# Patient Record
Sex: FEMALE | Race: ASIAN | NOT HISPANIC OR LATINO | URBAN - METROPOLITAN AREA
[De-identification: names, ages, dates, MRNs, and addresses within clinical notes are randomized per-mention and may not be internally consistent; named-entity substitution may affect disease eponyms.]

---

## 2017-01-29 ENCOUNTER — OFFICE VISIT (OUTPATIENT)
Dept: URGENT CARE | Facility: URGENT CARE | Age: 42
End: 2017-01-29
Payer: COMMERCIAL

## 2017-01-29 VITALS
RESPIRATION RATE: 16 BRPM | TEMPERATURE: 98.1 F | HEART RATE: 91 BPM | DIASTOLIC BLOOD PRESSURE: 60 MMHG | SYSTOLIC BLOOD PRESSURE: 98 MMHG | BODY MASS INDEX: 19.49 KG/M2 | OXYGEN SATURATION: 98 % | WEIGHT: 110 LBS | HEIGHT: 63 IN

## 2017-01-29 DIAGNOSIS — I88.9 CERVICAL ADENITIS: Primary | ICD-10-CM

## 2017-01-29 PROCEDURE — 99213 OFFICE O/P EST LOW 20 MIN: CPT | Performed by: FAMILY MEDICINE

## 2017-01-29 NOTE — MR AVS SNAPSHOT
After Visit Summary   1/29/2017    Dennise Burroughs    MRN: [id number]           Patient Information     Date Of Birth          1975        Visit Information        Provider Department      1/29/2017 11:00 AM Melanie Sears MD [facility]        Today's Diagnoses     Swollen lymph nodes    -  1       Care Instructions      Local Lymph Node Swelling in the Neck, No Antibiotic Treatment    You have a swollen lymph node in your neck that is not infected. The lymph nodes are part of the immune system. They are found under the jaw and along the side of the neck, in the armpits, and in the groin. A nearby infection or inflammation causes the lymph nodes in that area to swell. They may also be mildly tender. This is normal.  Antibiotics are not used for a swollen lymph node that is not infected. You can use hot compresses and pain medicine to treat this condition. The pain will get better over the next 7 to 10 days. The swelling may take several months to go away.  Rarely, a bacterial infection occurs inside the lymph node, itself. When this happens, the lymph node becomes very painful and the nearby skin gets red and warm. You may also have a fever. If this happens, call your health care provider. You may need to take antibiotics. You may also need to have the lymph node drained.  Home care  Follow these guidelines when caring for yourself at home:    Make a hot compress by running hot water over a face cloth. Put the compress on the sore area until the compress cools off. Repeat this for 20 minutes. Use the hot compress 3 times a day for the first 3 days, or until the pain and redness begin to get better. The heat will make more blood flow to the area and speed the healing process.    You may use acetaminophen or ibuprofen to control pain and fever, unless another medicine was prescribed for this. Don t use ibuprofen in children under 6 months of age. If you have chronic liver or  kidney disease, talk with your health care provider before using these medicines. Also talk with your provider if you ve had a stomach ulcer or GI bleeding. Don t give aspirin to anyone under 18 years of age who is ill with a fever. It may cause severe liver damage.  Follow-up care  Follow up with your health care provider, or as advised.  When to seek medical advice  Call your health care provider right away if any of these occur:    Redness over the lymph node    Swelling or pain in the lymph node gets worse    Lymph node is getting soft in the middle    Pus or fluid drains from the lymph node    You have difficulty breathing or swallowing    Fever of 101 F (38.3 C) oral or higher that doesn t get better with fever medicine    You have questions or concerns     1778-5062 [employer]. [address] [place] [address], [place] [address]. All rights reserved. This information is not intended as a substitute for professional medical care. Always follow your healthcare professional's instructions.              Follow-ups after your visit        Who to contact     If you have questions or need follow up information about today's clinic visit or your schedule please contact [facility] directly at [phone].  Normal or non-critical lab and imaging results will be communicated to you by [employer], letter or phone within 4 business days after the clinic has received the results. If you do not hear from us within 7 days, please contact the clinic through [employer] or phone. If you have a critical or abnormal lab result, we will notify you by phone as soon as possible.  Submit refill requests through [employer] or call your pharmacy and they will forward the refill request to us. Please allow 3 business days for your refill to be completed.          Additional Information About Your Visit        [employer] Information     [employer] gives you secure access to your electronic health record. If you see a  "primary care provider, you can also send messages to your care team and make appointments. If you have questions, please call your primary care clinic.  If you do not have a primary care provider, please call [phone] and they will assist you.        Care EveryWhere ID     This is your Care EveryWhere ID. This could be used by other organizations to access your [facility] medical records  [id number]        Your Vitals Were     Pulse Temperature Respirations Height BMI (Body Mass Index) Pulse Oximetry    91 98.1  F (36.7  C) (Oral) 16 5' 3\" (1.6 m) 19.49 kg/m2 98%    Breastfeeding?                   No            Blood Pressure from Last 3 Encounters:   01/29/17 98/60   11/27/16 96/60   05/30/16 110/70    Weight from Last 3 Encounters:   01/29/17 110 lb (49.896 kg)   11/27/16 110 lb (49.896 kg)   05/30/16 125 lb (56.7 kg)              Today, you had the following     No orders found for display         Today's Medication Changes          These changes are accurate as of: 1/29/17 12:42 PM.  If you have any questions, ask your nurse or doctor.               Stop taking these medicines if you haven't already. Please contact your care team if you have questions.     sulfamethoxazole-trimethoprim 800-160 MG per tablet   Commonly known as:  BACTRIM DS/SEPTRA DS   Stopped by:  Melanie Sears MD                    Primary Care Provider Office Phone # Fax #    [facility] MD Annelise [phone]       [address] [place] [address]        Thank you!     Thank you for choosing [facility]  for your care. Our goal is always to provide you with excellent care. Hearing back from our patients is one way we can continue to improve our services. Please take a few minutes to complete the written survey that you may receive in the mail after your visit with us. Thank you!             Your Updated Medication List - Protect others around you: Learn how to safely use, store and throw away " your medicines at www.disposemymeds.org.          This list is accurate as of: 1/29/17 12:42 PM.  Always use your most recent med list.                   Brand Name Dispense Instructions for use    calcium-magnesium 500-250 MG Tabs per tablet    CALMAG     Take 1 tablet by mouth daily       dicloxacillin 500 MG capsule    DYNAPEN    40 capsule    Take 1 capsule (500 mg) by mouth 4 times daily       hydrocortisone 2.5 % cream    ANUSOL-HC    28.35 g    Place rectally 2 times daily as needed for hemorrhoids       ibuprofen 600 MG tablet    ADVIL/MOTRIN    120 tablet    Take 1 tablet (600 mg) by mouth every 6 hours as needed for moderate pain       PRENATAL VITAMINS PO          senna-docusate 8.6-50 MG per tablet    SENOKOT-S;PERICOLACE    100 tablet    Take 1-2 tablets by mouth 2 times daily       simethicone 80 MG chewable tablet    MYLICON    100 tablet    Take 1 tablet (80 mg) by mouth 4 times daily as needed for other (gas)       SOLUBLE FIBER/PROBIOTICS PO          VITAMIN D3 PO      Take by mouth daily

## 2017-01-29 NOTE — PROGRESS NOTES
SUBJECTIVE:   Dennise Burroughs is a 41 year old female presenting with a chief complaint of swollen gland. Report she has left side of jaw and neck are sore. Redness and swollen   Onset of symptoms was 2 day(s) ago.  Course of illness is worsening.    Severity moderate  Current and Associated symptoms: rhinorrhea  Treatment measures tried include None tried.  Predisposing factors include None.    Past Medical History   Diagnosis Date     Hx of previous reproductive problem      Pyelonephritis      hospitalized mid 20's     History of IBS      using probiotics     Current Outpatient Prescriptions   Medication Sig Dispense Refill     Prenatal Multivit-Min-Fe-FA (PRENATAL VITAMINS PO)        dicloxacillin (DYNAPEN) 500 MG capsule Take 1 capsule (500 mg) by mouth 4 times daily 40 capsule 0     hydrocortisone (ANUSOL-HC) 2.5 % rectal cream Place rectally 2 times daily as needed for hemorrhoids 28.35 g 1     senna-docusate (SENOKOT-S;PERICOLACE) 8.6-50 MG per tablet Take 1-2 tablets by mouth 2 times daily 100 tablet 2     ibuprofen (ADVIL,MOTRIN) 600 MG tablet Take 1 tablet (600 mg) by mouth every 6 hours as needed for moderate pain 120 tablet 0     simethicone (MYLICON) 80 MG chewable tablet Take 1 tablet (80 mg) by mouth 4 times daily as needed for other (gas) 100 tablet 0     Probiotic Product (SOLUBLE FIBER/PROBIOTICS PO)        calcium-magnesium (CALMAG) 500-250 MG TABS Take 1 tablet by mouth daily       Cholecalciferol (VITAMIN D3 PO) Take by mouth daily       Social History   Substance Use Topics     Smoking status: Never Smoker      Smokeless tobacco: Never Used     Alcohol Use: 0.0 oz/week     0 Standard drinks or equivalent per week       ROS:  CONSTITUTIONAL:NEGATIVE for fever, chills, change in weight  ENT/MOUTH: POSITIVE for rhinorrhea-clear  RESP:NEGATIVE for significant cough or SOB  CV: NEGATIVE for chest pain, palpitations or peripheral edema  Lymph: POSITIVE for swollen gland    OBJECTIVE  :BP 98/60 mmHg   "Pulse 91  Temp(Src) 98.1  F (36.7  C) (Oral)  Resp 16  Ht 5' 3\" (1.6 m)  Wt 110 lb (49.896 kg)  BMI 19.49 kg/m2  SpO2 98%  Breastfeeding? No  GENERAL APPEARANCE: healthy, alert and no distress  EYES: EOMI,  PERRL, conjunctiva clear  HENT: ear canals and TM's normal.  Nose and mouth without ulcers, erythema or lesions  NECK: cervical adenopathy. No sign of infection   RESP: lungs clear to auscultation - no rales, rhonchi or wheezes  CV: regular rates and rhythm, normal S1 S2, no murmur noted    ASSESSMENT:  1. Cervical adenitis: No sign of infection   -Patient education and reassurance provided  -Encouraged to do hot compresses at least 3-4 times per day for at least 20 minutes   -Ibuprofen prn for pain-control   -Reviewed warning sign with the patient   -Follow up if not improving or worsening       Melanie Sears MD  [facility]    "

## 2017-01-29 NOTE — NURSING NOTE
"Chief Complaint   Patient presents with     Urgent Care     Facial Swelling     left side of jaw and neck are sore x 2 days, red and swollen.        Initial BP 98/60 mmHg  Pulse 91  Temp(Src) 98.1  F (36.7  C) (Oral)  Resp 16  Ht 5' 3\" (1.6 m)  Wt 110 lb (49.896 kg)  BMI 19.49 kg/m2  SpO2 98%  Breastfeeding? No Estimated body mass index is 19.49 kg/(m^2) as calculated from the following:    Height as of this encounter: 5' 3\" (1.6 m).    Weight as of this encounter: 110 lb (49.896 kg).  BP completed using cuff size: regular  "

## 2017-01-29 NOTE — PATIENT INSTRUCTIONS
Local Lymph Node Swelling in the Neck, No Antibiotic Treatment    You have a swollen lymph node in your neck that is not infected. The lymph nodes are part of the immune system. They are found under the jaw and along the side of the neck, in the armpits, and in the groin. A nearby infection or inflammation causes the lymph nodes in that area to swell. They may also be mildly tender. This is normal.  Antibiotics are not used for a swollen lymph node that is not infected. You can use hot compresses and pain medicine to treat this condition. The pain will get better over the next 7 to 10 days. The swelling may take several months to go away.  Rarely, a bacterial infection occurs inside the lymph node, itself. When this happens, the lymph node becomes very painful and the nearby skin gets red and warm. You may also have a fever. If this happens, call your health care provider. You may need to take antibiotics. You may also need to have the lymph node drained.  Home care  Follow these guidelines when caring for yourself at home:    Make a hot compress by running hot water over a face cloth. Put the compress on the sore area until the compress cools off. Repeat this for 20 minutes. Use the hot compress 3 times a day for the first 3 days, or until the pain and redness begin to get better. The heat will make more blood flow to the area and speed the healing process.    You may use acetaminophen or ibuprofen to control pain and fever, unless another medicine was prescribed for this. Don t use ibuprofen in children under 6 months of age. If you have chronic liver or kidney disease, talk with your health care provider before using these medicines. Also talk with your provider if you ve had a stomach ulcer or GI bleeding. Don t give aspirin to anyone under 18 years of age who is ill with a fever. It may cause severe liver damage.  Follow-up care  Follow up with your health care provider, or as advised.  When to seek medical  advice  Call your health care provider right away if any of these occur:    Redness over the lymph node    Swelling or pain in the lymph node gets worse    Lymph node is getting soft in the middle    Pus or fluid drains from the lymph node    You have difficulty breathing or swallowing    Fever of 101 F (38.3 C) oral or higher that doesn t get better with fever medicine    You have questions or concerns     [id number] [employer]. [address] [place] [address], [place] [address]. All rights reserved. This information is not intended as a substitute for professional medical care. Always follow your healthcare professional's instructions.

## 2017-06-01 ASSESSMENT — PATIENT HEALTH QUESTIONNAIRE - PHQ9
SUM OF ALL RESPONSES TO PHQ QUESTIONS 1-9: 1
10. IF YOU CHECKED OFF ANY PROBLEMS, HOW DIFFICULT HAVE THESE PROBLEMS MADE IT FOR YOU TO DO YOUR WORK, TAKE CARE OF THINGS AT HOME, OR GET ALONG WITH OTHER PEOPLE: NOT DIFFICULT AT ALL
SUM OF ALL RESPONSES TO PHQ QUESTIONS 1-9: 1

## 2017-06-01 ASSESSMENT — ENCOUNTER SYMPTOMS
DIFFICULTY URINATING: 0
HEMATURIA: 0
FLANK PAIN: 0
DYSURIA: 0

## 2017-06-01 ASSESSMENT — ANXIETY QUESTIONNAIRES
5. BEING SO RESTLESS THAT IT IS HARD TO SIT STILL: NOT AT ALL
7. FEELING AFRAID AS IF SOMETHING AWFUL MIGHT HAPPEN: NOT AT ALL
3. WORRYING TOO MUCH ABOUT DIFFERENT THINGS: NOT AT ALL
GAD7 TOTAL SCORE: 0
7. FEELING AFRAID AS IF SOMETHING AWFUL MIGHT HAPPEN: NOT AT ALL
GAD7 TOTAL SCORE: 3
6. BECOMING EASILY ANNOYED OR IRRITABLE: NOT AT ALL
GAD7 TOTAL SCORE: 0
2. NOT BEING ABLE TO STOP OR CONTROL WORRYING: NOT AT ALL
4. TROUBLE RELAXING: NEARLY EVERY DAY
1. FEELING NERVOUS, ANXIOUS, OR ON EDGE: NOT AT ALL

## 2017-06-02 ASSESSMENT — PATIENT HEALTH QUESTIONNAIRE - PHQ9: SUM OF ALL RESPONSES TO PHQ QUESTIONS 1-9: 1

## 2017-06-15 ENCOUNTER — OFFICE VISIT (OUTPATIENT)
Dept: OBGYN | Facility: CLINIC | Age: 42
End: 2017-06-15
Attending: OBSTETRICS & GYNECOLOGY
Payer: COMMERCIAL

## 2017-06-15 VITALS
HEIGHT: 63 IN | WEIGHT: 115.4 LBS | SYSTOLIC BLOOD PRESSURE: 104 MMHG | DIASTOLIC BLOOD PRESSURE: 71 MMHG | BODY MASS INDEX: 20.45 KG/M2 | HEART RATE: 77 BPM

## 2017-06-15 DIAGNOSIS — R32 URINARY INCONTINENCE, UNSPECIFIED TYPE: ICD-10-CM

## 2017-06-15 DIAGNOSIS — Z12.31 ENCOUNTER FOR SCREENING MAMMOGRAM FOR BREAST CANCER: ICD-10-CM

## 2017-06-15 DIAGNOSIS — Z01.419 ENCOUNTER FOR GYNECOLOGICAL EXAMINATION WITHOUT ABNORMAL FINDING: Primary | ICD-10-CM

## 2017-06-15 DIAGNOSIS — R39.15 URINARY URGENCY: ICD-10-CM

## 2017-06-15 PROCEDURE — 99212 OFFICE O/P EST SF 10 MIN: CPT | Mod: ZF

## 2017-06-15 NOTE — LETTER
6/15/2017       RE: Dennise Burroughs  [address]   [place] [address]     Dear Colleague,    Thank you for referring your patient, Dennise Burroughs, to the [facility] at [facility]. Please see a copy of my visit note below.    Gynecology Visit Note  6/15/17    Reason for visit: Annual exam    HPI: Patient is a 40 yo  who presents today for annual exam.  She has been doing well and her daughter is getting bigger and walking now.  She feels like they are much better adjusted than after her daughter was born.  Her parents have been staying with them to assist with caring for her daughter which has been nice, but they are returning to [place] soon.  Overall, she denies any major issues from a gynecological standpoint and just wants to make sure everything looks ok.  She continues to breastfeed and has not had return of menses.  They have not had intercourse often due to her parents being with them, and her  also has some libido issues secondary to depression medications.    Patient does state she has been having some issues with urinary urgency, not stress related.  Goes to bathroom often, but has always been the case.  Denies dysuria.  She occasionally has incontinence secondary to urge and inability to get to bathroom.  Wondering if anything can be done about this.    Past OB/GYN History:  : PLTCS for Cat II tracing RFD  Menses: No menses, still breastfeeding, plans for 2 years  Pap smear History: No history of abnormal, last 2016 was NILM, HPV negative  No STI history  Used IVF to conceive last pregnancy, unsure if will have any further pregnancies  No vaginal discharge or dyspareunia although limited intercourse currently as above    Past Medical History:   Diagnosis Date     History of IBS      Hx of previous reproductive problem      Pyelonephritis      Past Surgical History:   Procedure Laterality Date      SECTION N/A  "3/15/2016    Procedure:  SECTION;  Surgeon: Maria R Andrade MD;  Location:  L+D     EYE SURGERY      Lasik     Current Outpatient Prescriptions   Medication     Probiotic Product (SOLUBLE FIBER/PROBIOTICS PO)     calcium-magnesium (CALMAG) 500-250 MG TABS     Prenatal Multivit-Min-Fe-FA (PRENATAL VITAMINS PO)     ibuprofen (ADVIL,MOTRIN) 600 MG tablet     Allergies   Allergen Reactions     Dicloxacillin Hives     Social History   Substance Use Topics     Smoking status: Never Smoker     Smokeless tobacco: Never Used     Alcohol use 0.0 oz/week     0 Standard drinks or equivalent per week     Family History   Problem Relation Age of Onset     Hypertension Father      oral      DIABETES Father      oral meds?      ROS:  Answers for HPI/ROS submitted by the patient on 2017, answers reviewed and unchanged on 6/15/17     O:  Vitals:    06/15/17 1050   BP: 104/71   Pulse: 77   Weight: 52.3 kg (115 lb 6.4 oz)   Height: 1.6 m (5' 3\")     General: NAD, A&Ox3  Neck: No thyromegaly, No thyroid nodules appreciated  Lungs: CTA B/L  CV: RRR  Breasts: Symmetrical, No lymphadenopathy, skin changes, nipple discharge or nodules appreciated bilaterally  Abdomen: Soft, NT, ND  Genitourinary:   External Genitalia:  General appearance; normal, Hair distribution; normal, Lesions absent  Urethral Meatus:  Size normal, Location normal, Lesions absent, Prolapse absent  Urethra:  Fullness absent, Masses absent  Bladder:  Fullness absent, Masses absent, Tenderness absent  Vagina:  General appearance normal, Estrogen effect normal, Discharge absent, Lesions absent  Cervix:  General appearance normal, Lesions absent, Discharge absent, Tenderness absent, Enlargement absent, Nodularity absent  Uterus:  Size normal, Position normal, Masses absent, Tenderness absent  Adenexa:  Masses absent, Tenderness absent     A/P: 40 yo  presents for annual exam, with additional concerns of urinary urgency, frequency and incontinence  1) " Normal breast and pelvic exam  2) Screening for malignant neoplasm of cervix: No due until 4/2021  3) Urinary symptoms: Not every day, had some of this prior to pregnancy as well.  Given referral to Urogynecology to discuss options for management.  Patient unsure of future pregnancies given age and need for IVF with this pregnancy.  4) Contraception: None desired, IVF with last pregnancy and if was able to conceive on own would be ok with this  5) Breast cancer screening: Patient has not had mammogram yet, discussed recommendations for screening with patient and agreeable to mammogram, orders placed today  6) RTC in 1 year for annual, earlier with any concerns    Maria R Andrade MD

## 2017-06-15 NOTE — MR AVS SNAPSHOT
After Visit Summary   6/15/2017    Dennise Burroughs    MRN: [id number]           Patient Information     Date Of Birth          1975        Visit Information        Provider Department      6/15/2017 11:00 AM Maria R Andrade MD [facility]        Today's Diagnoses     Encounter for gynecological examination without abnormal finding    -  1    Encounter for screening mammogram for breast cancer        Urinary urgency        Urinary incontinence, unspecified type           Follow-ups after your visit        Additional Services     UROLOGY ADULT REFERRAL       Your provider has referred you to: Dr. Chavarria    Please be aware that coverage of these services is subject to the terms and limitations of your health insurance plan.  Call member services at your health plan with any benefit or coverage questions.      Please bring the following with you to your appointment:    (1) Any X-Rays, CTs or MRIs which have been performed.  Contact the facility where they were done to arrange for  prior to your scheduled appointment.    (2) List of current medications  (3) This referral request   (4) Any documents/labs given to you for this referral                  Follow-up notes from your care team     Return for Patient needs new patient visit with Dr. Chavarria, urinary urgency/incontinence.      Your next 10 appointments already scheduled     Aug 02, 2017 10:00 AM CDT   New Patient Visit with Frida Chavarria MD   [facility] ([facility])    Critical access hospital  [address] [address]  [facility] [address] [phone]              Who to contact     Please call your clinic at [phone] to:    Ask questions about your health    Make or cancel appointments    Discuss your medicines    Learn about your test results    Speak to your doctor   If you have compliments or concerns about an experience at your clinic, or if you wish to file  "a complaint, please contact [facility] Patient Relations at [phone] or email us at [email or web address]         Additional Information About Your Visit        [employer] Information     [employer] gives you secure access to your electronic health record. If you see a primary care provider, you can also send messages to your care team and make appointments. If you have questions, please call your primary care clinic.  If you do not have a primary care provider, please call [phone] and they will assist you.      [employer] is an electronic gateway that provides easy, online access to your medical records. With [employer], you can request a clinic appointment, read your test results, renew a prescription or communicate with your care team.     To access your existing account, please contact your [facility] or call [phone] for assistance.        Care EveryWhere ID     This is your Care EveryWhere ID. This could be used by other organizations to access your [place] medical records  [id number]        Your Vitals Were     Pulse Height BMI (Body Mass Index)             77 1.6 m (5' 3\") 20.44 kg/m2          Blood Pressure from Last 3 Encounters:   06/15/17 104/71   01/29/17 98/60   11/27/16 96/60    Weight from Last 3 Encounters:   06/15/17 52.3 kg (115 lb 6.4 oz)   01/29/17 49.9 kg (110 lb)   11/27/16 49.9 kg (110 lb)              We Performed the Following     UROLOGY ADULT REFERRAL          Today's Medication Changes          These changes are accurate as of: 6/15/17 11:59 PM.  If you have any questions, ask your nurse or doctor.               Stop taking these medicines if you haven't already. Please contact your care team if you have questions.     dicloxacillin 500 MG capsule   Commonly known as:  DYNAPEN   Stopped by:  Maria R Andrade MD           hydrocortisone 2.5 % cream   Commonly known as:  ANUSOL-HC   Stopped by:  Maria R Andrade MD "           senna-docusate 8.6-50 MG per tablet   Commonly known as:  SENOKOT-S;PERICOLACE   Stopped by:  Maria R Andrade MD           simethicone 80 MG chewable tablet   Commonly known as:  MYLICON   Stopped by:  Maria R Andrade MD           VITAMIN D3 PO   Stopped by:  Maria R Andrade MD                    Primary Care Provider Office Phone # Fax #    [facility] [phone]       [address] [place] [address]        Thank you!     Thank you for choosing [facility]  for your care. Our goal is always to provide you with excellent care. Hearing back from our patients is one way we can continue to improve our services. Please take a few minutes to complete the written survey that you may receive in the mail after your visit with us. Thank you!             Your Updated Medication List - Protect others around you: Learn how to safely use, store and throw away your medicines at www.disposemymeds.org.          This list is accurate as of: 6/15/17 11:59 PM.  Always use your most recent med list.                   Brand Name Dispense Instructions for use    calcium-magnesium 500-250 MG Tabs per tablet    CALMAG     Take 1 tablet by mouth daily       ibuprofen 600 MG tablet    ADVIL/MOTRIN    120 tablet    Take 1 tablet (600 mg) by mouth every 6 hours as needed for moderate pain       PRENATAL VITAMINS PO          SOLUBLE FIBER/PROBIOTICS PO

## 2017-07-08 ENCOUNTER — OFFICE VISIT (OUTPATIENT)
Dept: URGENT CARE | Facility: URGENT CARE | Age: 42
End: 2017-07-08
Payer: COMMERCIAL

## 2017-07-08 VITALS
HEART RATE: 78 BPM | DIASTOLIC BLOOD PRESSURE: 60 MMHG | TEMPERATURE: 97.6 F | HEIGHT: 63 IN | OXYGEN SATURATION: 98 % | BODY MASS INDEX: 20.38 KG/M2 | SYSTOLIC BLOOD PRESSURE: 102 MMHG | WEIGHT: 115 LBS

## 2017-07-08 DIAGNOSIS — L29.9 EAR ITCHING: Primary | ICD-10-CM

## 2017-07-08 DIAGNOSIS — H61.21 IMPACTED CERUMEN OF RIGHT EAR: ICD-10-CM

## 2017-07-08 PROCEDURE — 99213 OFFICE O/P EST LOW 20 MIN: CPT | Performed by: FAMILY MEDICINE

## 2017-07-08 RX ORDER — CIPROFLOXACIN AND DEXAMETHASONE 3; 1 MG/ML; MG/ML
4 SUSPENSION/ DROPS AURICULAR (OTIC) 2 TIMES DAILY
Qty: 7.5 ML | Refills: 0 | Status: SHIPPED | OUTPATIENT
Start: 2017-07-08 | End: 2017-07-15

## 2017-07-08 NOTE — PATIENT INSTRUCTIONS
Ibuprofen for discomfort this weekend.  If the irritation/discomfort in the right ear doesn't improve after 48 hours, go ahead and fill the prescription for the ear drops and put those in your ear twice a day for a week.  Keep your ears dry if you start the ear drops.

## 2017-07-08 NOTE — MR AVS SNAPSHOT
After Visit Summary   7/8/2017    Dennise Burroughs    MRN: [id number]           Patient Information     Date Of Birth          1975        Visit Information        Provider Department      7/8/2017 9:35 AM Raiza Benson DO [facility]        Today's Diagnoses     Ear itching    -  1    Impacted cerumen of right ear          Care Instructions    Ibuprofen for discomfort this weekend.  If the irritation/discomfort in the right ear doesn't improve after 48 hours, go ahead and fill the prescription for the ear drops and put those in your ear twice a day for a week.  Keep your ears dry if you start the ear drops.          Follow-ups after your visit        Your next 10 appointments already scheduled     Aug 02, 2017 10:00 AM CDT   New Patient Visit with Frida Chavarria MD   [facility] ([facility])    [facility]  [address] [place] [address], [address] [place] [address], [address]  [facility] [address] [phone]              Who to contact     If you have questions or need follow up information about today's clinic visit or your schedule please contact [facility] directly at [phone].  Normal or non-critical lab and imaging results will be communicated to you by MyChart, letter or phone within 4 business days after the clinic has received the results. If you do not hear from us within 7 days, please contact the clinic through MyChart or phone. If you have a critical or abnormal lab result, we will notify you by phone as soon as possible.  Submit refill requests through [employer] or call your pharmacy and they will forward the refill request to us. Please allow 3 business days for your refill to be completed.          Additional Information About Your Visit        MyChart Information     [employer] gives you secure access to your electronic health record. If you see a primary care provider, you can also send messages to your  "care team and make appointments. If you have questions, please call your primary care clinic.  If you do not have a primary care provider, please call [phone] and they will assist you.        Care EveryWhere ID     This is your Care EveryWhere ID. This could be used by other organizations to access your [facility] medical records  [id number]        Your Vitals Were     Pulse Temperature Height Pulse Oximetry BMI (Body Mass Index)       78 97.6  F (36.4  C) (Oral) 5' 3\" (1.6 m) 98% 20.37 kg/m2        Blood Pressure from Last 3 Encounters:   07/08/17 102/60   06/15/17 104/71   01/29/17 98/60    Weight from Last 3 Encounters:   07/08/17 115 lb (52.2 kg)   06/15/17 115 lb 6.4 oz (52.3 kg)   01/29/17 110 lb (49.9 kg)              Today, you had the following     No orders found for display         Today's Medication Changes          These changes are accurate as of: 7/8/17 10:20 AM.  If you have any questions, ask your nurse or doctor.               Start taking these medicines.        Dose/Directions    ciprofloxacin-dexamethasone otic suspension   Commonly known as:  CIPRODEX   Used for:  Ear itching   Started by:  Raiza Benson DO        Dose:  4 drop   Place 4 drops into the right ear 2 times daily for 7 days   Quantity:  7.5 mL   Refills:  0            Where to get your medicines      Some of these will need a paper prescription and others can be bought over the counter.  Ask your nurse if you have questions.     Bring a paper prescription for each of these medications     ciprofloxacin-dexamethasone otic suspension                Primary Care Provider Office Phone # Fax #    [facility] MD Annelise [phone]       [address] [place] [address]        Equal Access to Services     EVIN WANG : evelyn Zhou, kwasi lechuga. So [facility] [phone].    ATENCIÓN: Si kareem thompson " disposición servicios gratuitos de asistencia lingüística. Miesha santos [phone].    We comply with applicable federal civil rights laws and [place] laws. We do not discriminate on the basis of race, color, national origin, age, disability sex, sexual orientation or gender identity.            Thank you!     Thank you for choosing [facility]  for your care. Our goal is always to provide you with excellent care. Hearing back from our patients is one way we can continue to improve our services. Please take a few minutes to complete the written survey that you may receive in the mail after your visit with us. Thank you!             Your Updated Medication List - Protect others around you: Learn how to safely use, store and throw away your medicines at www.disposemymeds.org.          This list is accurate as of: 7/8/17 10:20 AM.  Always use your most recent med list.                   Brand Name Dispense Instructions for use Diagnosis    calcium-magnesium 500-250 MG Tabs per tablet    CALMAG     Take 1 tablet by mouth daily        ciprofloxacin-dexamethasone otic suspension    CIPRODEX    7.5 mL    Place 4 drops into the right ear 2 times daily for 7 days    Ear itching       PRENATAL VITAMINS PO           SOLUBLE FIBER/PROBIOTICS PO           VITAMIN D (CHOLECALCIFEROL) PO      Take by mouth daily

## 2017-07-08 NOTE — PROGRESS NOTES
"SUBJECTIVE:   Dennise Burroughs is a 41 year old female presenting with a chief complaint of right ear itching and discomfort for the past few days.   No drainage noted.  No fevers.  No uri symptoms.   No recent swimming.   Feeling otherwise well.      ROS:  5-Point Review of Systems Negative-- Except as stated above.    OBJECTIVE  /60 (BP Location: Right arm, Patient Position: Chair, Cuff Size: Adult Regular)  Pulse 78  Temp 97.6  F (36.4  C) (Oral)  Ht 5' 3\" (1.6 m)  Wt 115 lb (52.2 kg)  SpO2 98%  BMI 20.37 kg/m2  GENERAL:  Awake, alert and interactive. No acute distress.  HEAD:   NC/AT, EOMI, clear conjunctiva.  Nose clear.  Pinna's benign.  No TTP over the tragus.  Left TM and EAC benign.  Right TM partially obstructed by excessive wax.  Attempted removal with curette, but some discomfort, will go with irrigation.  After irrigation EAC with erythema/slight swelling at site of impaction on roof of the EAC      ASSESSMENT/PLAN    ICD-10-CM    1. Ear itching L29.9 ciprofloxacin-dexamethasone (CIPRODEX) otic suspension   2. Impacted cerumen of right ear H61.21      Appears to be inflammation due to the hard impaction/attachment site.    Symptomatic cares for now.  rx to hold/fill if not improving as expected.  Patient Instructions   Ibuprofen for discomfort this weekend.  If the irritation/discomfort in the right ear doesn't improve after 48 hours, go ahead and fill the prescription for the ear drops and put those in your ear twice a day for a week.  Keep your ears dry if you start the ear drops.          "

## 2017-07-30 ASSESSMENT — ENCOUNTER SYMPTOMS
WEIGHT GAIN: 0
EYE PAIN: 0
JAUNDICE: 0
NIGHT SWEATS: 0
DIARRHEA: 1
RECTAL BLEEDING: 0
HEMATURIA: 0
HALLUCINATIONS: 0
POLYPHAGIA: 0
EYE WATERING: 1
RECTAL PAIN: 0
VOMITING: 0
DYSURIA: 0
CHILLS: 0
ALTERED TEMPERATURE REGULATION: 0
WEIGHT LOSS: 0
DOUBLE VISION: 0
DECREASED APPETITE: 0
INCREASED ENERGY: 0
HEARTBURN: 0
NAUSEA: 0
EYE REDNESS: 1
DIFFICULTY URINATING: 0
BOWEL INCONTINENCE: 0
FATIGUE: 1
BLOOD IN STOOL: 0
POLYDIPSIA: 0
FEVER: 0
BLOATING: 0
CONSTIPATION: 0
ABDOMINAL PAIN: 1
FLANK PAIN: 0
EYE IRRITATION: 1

## 2017-08-02 ENCOUNTER — OFFICE VISIT (OUTPATIENT)
Dept: UROLOGY | Facility: CLINIC | Age: 42
End: 2017-08-02
Attending: UROLOGY
Payer: COMMERCIAL

## 2017-08-02 VITALS
SYSTOLIC BLOOD PRESSURE: 112 MMHG | DIASTOLIC BLOOD PRESSURE: 72 MMHG | WEIGHT: 115.1 LBS | BODY MASS INDEX: 20.39 KG/M2 | HEIGHT: 63 IN | HEART RATE: 87 BPM

## 2017-08-02 DIAGNOSIS — M62.89 PFD (PELVIC FLOOR DYSFUNCTION): ICD-10-CM

## 2017-08-02 DIAGNOSIS — N32.81 URGENCY-FREQUENCY SYNDROME: ICD-10-CM

## 2017-08-02 DIAGNOSIS — M79.18 MYALGIA OF PELVIC FLOOR: ICD-10-CM

## 2017-08-02 DIAGNOSIS — N39.3 FEMALE STRESS INCONTINENCE: Primary | ICD-10-CM

## 2017-08-02 PROCEDURE — 87109 MYCOPLASMA: CPT | Mod: 91 | Performed by: UROLOGY

## 2017-08-02 PROCEDURE — 87109 MYCOPLASMA: CPT | Performed by: UROLOGY

## 2017-08-02 PROCEDURE — 99212 OFFICE O/P EST SF 10 MIN: CPT | Mod: ZF

## 2017-08-02 ASSESSMENT — ENCOUNTER SYMPTOMS
BREAST MASS: 0
BLOOD IN STOOL: 0
NECK MASS: 0
LEG SWELLING: 0
JOINT SWELLING: 0
DECREASED LIBIDO: 0
ORTHOPNEA: 0
LEG PAIN: 0
SINUS PAIN: 0
CONSTIPATION: 0
SHORTNESS OF BREATH: 0
MEMORY LOSS: 0
SWOLLEN GLANDS: 0
CLAUDICATION: 0
PALPITATIONS: 0
DYSPNEA ON EXERTION: 0
WEAKNESS: 0
POSTURAL DYSPNEA: 0
DISTURBANCES IN COORDINATION: 0
SPEECH CHANGE: 0
BACK PAIN: 0
WEIGHT GAIN: 0
EYE IRRITATION: 1
LIGHT-HEADEDNESS: 0
DYSURIA: 0
FATIGUE: 1
SKIN CHANGES: 0
HEMATURIA: 0
HYPERTENSION: 0
BRUISES/BLEEDS EASILY: 0
LOSS OF CONSCIOUSNESS: 0
TINGLING: 0
NECK PAIN: 0
VOMITING: 0
TROUBLE SWALLOWING: 0
SYNCOPE: 0
FEVER: 0
NERVOUS/ANXIOUS: 0
ARTHRALGIAS: 0
DOUBLE VISION: 0
EYE PAIN: 0
SORE THROAT: 0
BREAST PAIN: 0
EYE WATERING: 1
DEPRESSION: 0
SLEEP DISTURBANCES DUE TO BREATHING: 0
MUSCLE CRAMPS: 0
EYE REDNESS: 1
SINUS CONGESTION: 0
POLYPHAGIA: 0
COUGH: 0
SNORES LOUDLY: 0
INSOMNIA: 0
STIFFNESS: 0
CHILLS: 0
PARALYSIS: 0
EXTREMITY NUMBNESS: 0
JAUNDICE: 0
EXERCISE INTOLERANCE: 0
HEADACHES: 0
DIARRHEA: 1
DECREASED CONCENTRATION: 0
POLYDIPSIA: 0
WHEEZING: 0
SMELL DISTURBANCE: 0
RESPIRATORY PAIN: 0
MUSCLE WEAKNESS: 0
HOARSE VOICE: 0
RECTAL BLEEDING: 0
HEMOPTYSIS: 0
NUMBNESS: 0
FLANK PAIN: 0
SPUTUM PRODUCTION: 0
HALLUCINATIONS: 0
DIFFICULTY URINATING: 0
NAIL CHANGES: 0
DECREASED APPETITE: 0
HEARTBURN: 0
BOWEL INCONTINENCE: 0
POOR WOUND HEALING: 0
TASTE DISTURBANCE: 0
SEIZURES: 0
BLOATING: 0
ALTERED TEMPERATURE REGULATION: 0
COUGH DISTURBING SLEEP: 0
ABDOMINAL PAIN: 1
INCREASED ENERGY: 0
HOT FLASHES: 0
NAUSEA: 0
NIGHT SWEATS: 0
MYALGIAS: 0
TREMORS: 0
RECTAL PAIN: 0
TACHYCARDIA: 0
HYPOTENSION: 0
PANIC: 0
DIZZINESS: 0
WEIGHT LOSS: 0

## 2017-08-02 NOTE — MR AVS SNAPSHOT
After Visit Summary   8/2/2017    Dennise Burroughs    MRN: [id number]           Patient Information     Date Of Birth          1975        Visit Information        Provider Department      8/2/2017 10:00 AM Frida Chavarria MD [facility]        Today's Diagnoses     Female stress incontinence    -  1    Urgency-frequency syndrome        Myalgia of pelvic floor        PFD (pelvic floor dysfunction)          Care Instructions    Websites with free information:    American Urogynecologic Society patient website: www.voicesforpfd.org    Total Control Program: www.totalcontrolprogram.com    We will let you know the results of your urine test and if any further treatment or evaluation needs to be done    Please see one of the dedicated pelvic floor physical therapist ([facility] [phone])    Please return to see me in 3-4 months, sooner if needed    It was a pleasure meeting with you today.  Thank you for allowing me and my team the privilege of caring for you today.  YOU are the reason we are here, and I truly hope we provided you with the excellent service you deserve.  Please let us know if there is anything else we can do for you so that we can be sure you are leaving completely satisfied with your care experience.                Follow-ups after your visit        Additional Services     [facility] Physical Therapy Referral       **This order will print in the [facility] Scheduling Office**    Physical Therapy, Hand Therapy and Chiropractic Care are available through:    *[facility]  *[facility]  *[facility]    Call one number to schedule at any of the above locations: [phone].    Your provider has referred you to: Physical Therapy at [facility] or [facility]    Indication/Reason for Referral: Women's Health (Please Complete Special Programs SmartList)  Onset of Illness: years  Therapy Orders:  Evaluate and Treat  Special Programs: None and Women's Health: Pelvic Dysfunction: urgency frequency, myofascial tenderness of the pelvic floor  Pelvic Floor Weakness: stress incontinece and  Biofeedback, E-Stim/TENS/TENS Rental if deemed appropriate by therapist, Exercise/HEP and Myofascial Release/Massage (internal)  Special Request: Exercise: Home Exercise Program  Manual Therapy: Myofascial Release/Massage (internal)  Modalities: As Indicated E-Stim/TENS    Paco Antonio      Additional Comments for the Therapist or Chiropractor: Ban gordon please.  Core strengthening    Please be aware that coverage of these services is subject to the terms and limitations of your health insurance plan.  Call member services at your health plan with any benefit or coverage questions.      Please bring the following to your appointment:    *Your personal calendar for scheduling future appointments  *Comfortable clothing                  Future tests that were ordered for you today     Open Future Orders        Priority Expected Expires Ordered    Routine UA with microscopic - No culture Routine  8/2/2018 8/2/2017    Urine Culture Aerobic Bacterial Routine  8/2/2018 8/2/2017            Who to contact     Please call your clinic at [phone] to:    Ask questions about your health    Make or cancel appointments    Discuss your medicines    Learn about your test results    Speak to your doctor   If you have compliments or concerns about an experience at your clinic, or if you wish to file a complaint, please contact [facility] Patient Relations at [phone] or email us at [email or web address] [email or web address]         Additional Information About Your Visit        [employer] Information     [employer] gives you secure access to your electronic health record. If you see a primary care provider, you can also send messages to your care team and make appointments. If you have questions, please call your primary care  "clinic.  If you do not have a primary care provider, please call [phone] and they will assist you.      [employer] is an electronic gateway that provides easy, online access to your medical records. With [employer], you can request a clinic appointment, read your test results, renew a prescription or communicate with your care team.     To access your existing account, please contact your [facility] or call [phone] for assistance.        Care EveryWhere ID     This is your Care EveryWhere ID. This could be used by other organizations to access your [place] medical records  [id number]        Your Vitals Were     Pulse Height Breastfeeding? BMI (Body Mass Index)          87 1.6 m (5' 3\") Yes 20.39 kg/m2         Blood Pressure from Last 3 Encounters:   08/02/17 112/72   07/08/17 102/60   06/15/17 104/71    Weight from Last 3 Encounters:   08/02/17 52.2 kg (115 lb 1.6 oz)   07/08/17 52.2 kg (115 lb)   06/15/17 52.3 kg (115 lb 6.4 oz)              We Performed the Following     [facility] Physical Therapy Referral     Mycoplasma large colony culture     Ureaplasma culture        Primary Care Provider Office Phone # Fax #    [facility] [phone]       [address] [place] [address]        Equal Access to Services     SHELDON WANG : Hadii edel ku hadasho Soomaali, waaxda luqadaha, qaybta kaalmada adeegyada, kwasi saavedra. So [facility] [phone].    ATENCIÓN: Si habla español, tiene a griffith disposición servicios gratuitos de asistencia lingüística. Llame al [phone].    We comply with applicable federal civil rights laws and [place] laws. We do not discriminate on the basis of race, color, national origin, age, disability sex, sexual orientation or gender identity.            Thank you!     Thank you for choosing [facility]  for your care. Our goal is always to provide you with excellent care. Hearing back " from our patients is one way we can continue to improve our services. Please take a few minutes to complete the written survey that you may receive in the mail after your visit with us. Thank you!             Your Updated Medication List - Protect others around you: Learn how to safely use, store and throw away your medicines at www.disposemymeds.org.          This list is accurate as of: 8/2/17 10:45 AM.  Always use your most recent med list.                   Brand Name Dispense Instructions for use Diagnosis    calcium-magnesium 500-250 MG Tabs per tablet    CALMAG     Take 1 tablet by mouth daily        PRENATAL VITAMINS PO           SOLUBLE FIBER/PROBIOTICS PO           VITAMIN D (CHOLECALCIFEROL) PO      Take by mouth daily

## 2017-08-02 NOTE — LETTER
2017       RE: Dennise Burroughs  [address]   [place] [address]     Dear Colleague,    Thank you for referring your patient, Dennise Burroughs, to the [facility] at [facility]. Please see a copy of my visit note below.    2017    Referring Provider: Referred Self, MD  No address on file    Primary Care Provider: [facility]    CC: Urgency frequency    HPI:  Dennise Burroughs is a 42 year old female who presents for evaluation of her pelvic floor symptoms.  She has urinary urgency frequency for a long time.  States that she has always gone more frequently than anyone else that she knew.  She also notes stress incontinence since the pregnancy, has gotten better over the last 16 months but still persists.  Patient is still breastfeeding.   Has not had any intercourse since the delivery.  Does note occasional discomfort with intercourse prior    Of note patient is not sure at this time if they will try for another pregnancy    Denies hematuria, UTI, constipation.   Never has seen a urologist before.    She denies any history of abuse.  Reports that she feels safe at home.    Past Medical History:   Diagnosis Date     History of IBS     using probiotics     Hx of previous reproductive problem      Pyelonephritis     hospitalized mid 20's     Past Surgical History:   Procedure Laterality Date      SECTION N/A 3/15/2016    Procedure:  SECTION;  Surgeon: Maria R Andrade MD;  Location:  L+D     EYE SURGERY      Lasik     Social History     Social History     Marital status:      Spouse name: Clayton      Number of children: N/A     Years of education: N/A     Occupational History     Instructor      Urdu language [employer]      Social History Main Topics     Smoking status: Never Smoker     Smokeless tobacco: Never Used     Alcohol use 0.0 oz/week     Drug use: No     Sexual activity: Not Currently     Partners:  Male     Birth control/ protection: None     Other Topics Concern     Caffeine Concern No     Exercise Yes     walking 30 minutes daily      Parent/Sibling W/ Cabg, Mi Or Angioplasty Before 65f 55m? No     Social History Narrative    How much exercise per week? One time    How much calcium per day? supplement       How much caffeine per day? none    How much vitamin D per day? supplement    Do you/your family wear seatbelts?  Yes    Do you/your family use safety helmets? Yes    Do you/your family use sunscreen? Yes    Do you/your family keep firearms in the home? No    Do you/your family have a smoke detector(s)? Yes        Do you feel safe in your home? Yes    Has anyone ever touched you in an unwanted manner? No     Explain Myra Jordan QUEVEDO 9/4/15             Family History   Problem Relation Age of Onset     Hypertension Father      oral      DIABETES Father      oral meds?      Review of Systems     Constitutional:  Positive for fatigue and recent stressors. Negative for fever, chills, weight loss, weight gain, decreased appetite, night sweats, height loss, post-operative complications, incisional pain, hallucinations, increased energy, hyperactivity and confused.   HENT:  Negative for ear pain, hearing loss, tinnitus, nosebleeds, trouble swallowing, hoarse voice, mouth sores, sore throat, ear discharge, tooth pain, gum tenderness, taste disturbance, smell disturbance, hearing aid, bleeding gums, dry mouth, sinus pain, sinus congestion and neck mass.    Eyes:  Positive for redness, eye watering, eye dryness and eye irritation. Negative for double vision, pain, eye pain, decreased vision, eye bulging, flashing lights, spots, floaters, strabismus, tunnel vision and jaundice.   Respiratory:   Negative for cough, hemoptysis, sputum production, shortness of breath, wheezing, sleep disturbances due to breathing, snores loudly, respiratory pain, dyspnea on exertion, cough disturbing sleep and postural dyspnea.     Cardiovascular:  Negative for chest pain, dyspnea on exertion, palpitations, orthopnea, claudication, leg swelling, fingers/toes turn blue, hypertension, hypotension, syncope, history of heart murmur, chest pain on exertion, chest pain at rest, pacemaker, few scattered varicosities, leg pain, sleep disturbances due to breathing, tachycardia, light-headedness, exercise intolerance and edema.   Gastrointestinal:  Positive for abdominal pain and diarrhea. Negative for heartburn, nausea, vomiting, constipation, blood in stool, melena, rectal pain, bloating, hemorrhoids, bowel incontinence, jaundice, rectal bleeding, coffee ground emesis and change in stool.   Genitourinary:  Positive for bladder incontinence. Negative for dysuria, urgency, hematuria, flank pain, vaginal discharge, difficulty urinating, genital sores, dyspareunia, decreased libido, nocturia, voiding less frequently, arousal difficulty, abnormal vaginal bleeding, excessive menstruation, menstrual changes, hot flashes, vaginal dryness and postmenopausal bleeding.   Musculoskeletal:  Negative for myalgias, back pain, joint swelling, arthralgias, stiffness, muscle cramps, neck pain, bone pain, muscle weakness and fracture.   Skin:  Negative for nail changes, itching, poor wound healing, rash, hair changes, skin changes, acne, warts, poor wound healing, scarring, flaky skin, Raynaud's phenomenon, sensitivity to sunlight and skin thickening.   Neurological:  Negative for dizziness, tingling, tremors, speech change, seizures, loss of consciousness, weakness, light-headedness, numbness, headaches, disturbances in coordination, extremity numbness, memory loss, difficulty walking and paralysis.   Endo/Heme:  Negative for anemia, swollen glands and bruises/bleeds easily.   Psychiatric/Behavioral:  Negative for depression, hallucinations, memory loss, decreased concentration, mood swings and panic attacks.    Breast:  Negative for breast discharge, breast mass,  "breast pain and nipple retraction.   Endocrine:  Negative for altered temperature regulation, polyphagia, polydipsia, unwanted hair growth and change in facial hair.    Allergies   Allergen Reactions     Dicloxacillin Hives     Current Outpatient Prescriptions   Medication     VITAMIN D, CHOLECALCIFEROL, PO     Probiotic Product (SOLUBLE FIBER/PROBIOTICS PO)     calcium-magnesium (CALMAG) 500-250 MG TABS     Prenatal Multivit-Min-Fe-FA (PRENATAL VITAMINS PO)     No current facility-administered medications for this visit.      /72 (BP Location: Left arm, Patient Position: Chair)  Pulse 87  Ht 1.6 m (5' 3\")  Wt 52.2 kg (115 lb 1.6 oz)  Breastfeeding? Yes  BMI 20.39 kg/m2 No LMP recorded. Patient is not currently having periods (Reason: Breast Feeding). Body mass index is 20.39 kg/(m^2).  She is alert, comfortable in no acute distress, non-labored breathing. Abdomen is soft, non-tender, non-distended, no CVAT.  Normal external female genitalia.  Negative ESST.  Speculum and bimanual exam are remarkable for myofascial tenderness of the pelvic floor.  She has excellent support on supine strain.  1/5 kegels.    Urine dip negative    PVR 0 mL by bladder scan    A/P: Dennise Burroughs is a 42 year old F with stress incontinence, urgency frequency, myofascial tenderness of the pelvic floor, pelvic floor dysfunction    At this time patient is not sure if she desires any further pregnancy so will pursue non surgical options for treatment of her stress incontinence    We discussed how her pelvic floor symptoms are related to the physical exam findings and her pelvic floor myofascial dysfunction.  We discussed how the recommended treatment is dedicated pelvic floor therapy.  We discussed how the pelvic floor physical therapy works and patient is agreeable.  Referral was placed.      Ureaplasma/mycoplasma were also sent today    RTC 3-4 months, sooner if needed    30 minutes were spent with the patient today, > 50% in " counseling and coordination of care    Frida Chavarria MD MPH    Urology    CC  Patient Care Team:  [facility] as [facility] - General  SELF, REFERRED

## 2017-08-02 NOTE — PROGRESS NOTES
2017    Referring Provider: Referred Self, MD  No address on file    Primary Care Provider: [facility] Annelise    CC: Urgency frequency    HPI:  Dennise Burroughs is a 42 year old female who presents for evaluation of her pelvic floor symptoms.  She has urinary urgency frequency for a long time.  States that she has always gone more frequently than anyone else that she knew.  She also notes stress incontinence since the pregnancy, has gotten better over the last 16 months but still persists.  Patient is still breastfeeding.   Has not had any intercourse since the delivery.  Does note occasional discomfort with intercourse prior    Of note patient is not sure at this time if they will try for another pregnancy    Denies hematuria, UTI, constipation.   Never has seen a urologist before.    She denies any history of abuse.  Reports that she feels safe at home.    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 No     Social History Narrative    How much exercise per week? One time    How much calcium per  day? supplement       How much caffeine per day? none    How much vitamin D per day? supplement    Do you/your family wear seatbelts?  Yes    Do you/your family use safety helmets? Yes    Do you/your family use sunscreen? Yes    Do you/your family keep firearms in the home? No    Do you/your family have a smoke detector(s)? Yes        Do you feel safe in your home? Yes    Has anyone ever touched you in an unwanted manner? No     Explain Myra Jordan QUEVEDO 9/4/15             Family History   Problem Relation Age of Onset     Hypertension Father      oral      DIABETES Father      oral meds?      Review of Systems     Constitutional:  Positive for fatigue and recent stressors. Negative for fever, chills, weight loss, weight gain, decreased appetite, night sweats, height loss, post-operative complications, incisional pain, hallucinations, increased energy, hyperactivity and confused.   HENT:  Negative for ear pain, hearing loss, tinnitus, nosebleeds, trouble swallowing, hoarse voice, mouth sores, sore throat, ear discharge, tooth pain, gum tenderness, taste disturbance, smell disturbance, hearing aid, bleeding gums, dry mouth, sinus pain, sinus congestion and neck mass.    Eyes:  Positive for redness, eye watering, eye dryness and eye irritation. Negative for double vision, pain, eye pain, decreased vision, eye bulging, flashing lights, spots, floaters, strabismus, tunnel vision and jaundice.   Respiratory:   Negative for cough, hemoptysis, sputum production, shortness of breath, wheezing, sleep disturbances due to breathing, snores loudly, respiratory pain, dyspnea on exertion, cough disturbing sleep and postural dyspnea.    Cardiovascular:  Negative for chest pain, dyspnea on exertion, palpitations, orthopnea, claudication, leg swelling, fingers/toes turn blue, hypertension, hypotension, syncope, history of heart murmur, chest pain on exertion, chest pain at rest, pacemaker, few scattered varicosities, leg pain,  sleep disturbances due to breathing, tachycardia, light-headedness, exercise intolerance and edema.   Gastrointestinal:  Positive for abdominal pain and diarrhea. Negative for heartburn, nausea, vomiting, constipation, blood in stool, melena, rectal pain, bloating, hemorrhoids, bowel incontinence, jaundice, rectal bleeding, coffee ground emesis and change in stool.   Genitourinary:  Positive for bladder incontinence. Negative for dysuria, urgency, hematuria, flank pain, vaginal discharge, difficulty urinating, genital sores, dyspareunia, decreased libido, nocturia, voiding less frequently, arousal difficulty, abnormal vaginal bleeding, excessive menstruation, menstrual changes, hot flashes, vaginal dryness and postmenopausal bleeding.   Musculoskeletal:  Negative for myalgias, back pain, joint swelling, arthralgias, stiffness, muscle cramps, neck pain, bone pain, muscle weakness and fracture.   Skin:  Negative for nail changes, itching, poor wound healing, rash, hair changes, skin changes, acne, warts, poor wound healing, scarring, flaky skin, Raynaud's phenomenon, sensitivity to sunlight and skin thickening.   Neurological:  Negative for dizziness, tingling, tremors, speech change, seizures, loss of consciousness, weakness, light-headedness, numbness, headaches, disturbances in coordination, extremity numbness, memory loss, difficulty walking and paralysis.   Endo/Heme:  Negative for anemia, swollen glands and bruises/bleeds easily.   Psychiatric/Behavioral:  Negative for depression, hallucinations, memory loss, decreased concentration, mood swings and panic attacks.    Breast:  Negative for breast discharge, breast mass, breast pain and nipple retraction.   Endocrine:  Negative for altered temperature regulation, polyphagia, polydipsia, unwanted hair growth and change in facial hair.    Allergies   Allergen Reactions     Dicloxacillin Hives     Current Outpatient Prescriptions   Medication     VITAMIN D,  "CHOLECALCIFEROL, PO     Probiotic Product (SOLUBLE FIBER/PROBIOTICS PO)     calcium-magnesium (CALMAG) 500-250 MG TABS     Prenatal Multivit-Min-Fe-FA (PRENATAL VITAMINS PO)     No current facility-administered medications for this visit.      /72 (BP Location: Left arm, Patient Position: Chair)  Pulse 87  Ht 1.6 m (5' 3\")  Wt 52.2 kg (115 lb 1.6 oz)  Breastfeeding? Yes  BMI 20.39 kg/m2 No LMP recorded. Patient is not currently having periods (Reason: Breast Feeding). Body mass index is 20.39 kg/(m^2).  She is alert, comfortable in no acute distress, non-labored breathing. Abdomen is soft, non-tender, non-distended, no CVAT.  Normal external female genitalia.  Negative ESST.  Speculum and bimanual exam are remarkable for myofascial tenderness of the pelvic floor.  She has excellent support on supine strain.  1/5 kegels.    Urine dip negative    PVR 0 mL by bladder scan    A/P: Dennise Burroughs is a 42 year old F with stress incontinence, urgency frequency, myofascial tenderness of the pelvic floor, pelvic floor dysfunction    At this time patient is not sure if she desires any further pregnancy so will pursue non surgical options for treatment of her stress incontinence    We discussed how her pelvic floor symptoms are related to the physical exam findings and her pelvic floor myofascial dysfunction.  We discussed how the recommended treatment is dedicated pelvic floor therapy.  We discussed how the pelvic floor physical therapy works and patient is agreeable.  Referral was placed.      Ureaplasma/mycoplasma were also sent today    RTC 3-4 months, sooner if needed    30 minutes were spent with the patient today, > 50% in counseling and coordination of care    Frida Chavarria MD MPH    Urology    CC  [facility] Team:  [facility] - [facility]  SELF, REFERRED              "

## 2017-08-02 NOTE — NURSING NOTE
Chief Complaint   Patient presents with     Consult For     Urinary urgency and incontinence. Pt had symptoms before her pregnancy and it got worse after delivery in 03/2016. She had a c section. She will have urine leakage during activities like running or with sneezing.     Residual urine on bladder scan is 0 mL.    See MIC Jarrett 8/2/2017

## 2017-08-02 NOTE — PATIENT INSTRUCTIONS
Websites with free information:    American Uro[employer] Society patient website: www.voicesforpfd.org    Total Control Program: www.totalcontrolprogram.com    We will let you know the results of your urine test and if any further treatment or evaluation needs to be done    Please see one of the dedicated pelvic floor physical therapist ([facility] [phone])    Please return to see me in 3-4 months, sooner if needed    It was a pleasure meeting with you today.  Thank you for allowing me and my team the privilege of caring for you today.  YOU are the reason we are here, and I truly hope we provided you with the excellent service you deserve.  Please let us know if there is anything else we can do for you so that we can be sure you are leaving completely satisfied with your care experience.

## 2017-08-09 LAB
BACTERIA SPEC CULT: NORMAL
BACTERIA SPEC CULT: NORMAL
MICRO REPORT STATUS: NORMAL
MICRO REPORT STATUS: NORMAL
SPECIMEN SOURCE: NORMAL
SPECIMEN SOURCE: NORMAL

## 2017-08-18 ENCOUNTER — THERAPY VISIT (OUTPATIENT)
Dept: PHYSICAL THERAPY | Facility: CLINIC | Age: 42
End: 2017-08-18
Payer: COMMERCIAL

## 2017-08-18 DIAGNOSIS — M79.18 MYALGIA OF PELVIC FLOOR: ICD-10-CM

## 2017-08-18 DIAGNOSIS — M99.05 SOMATIC DYSFUNCTION OF PELVIC REGION: Primary | ICD-10-CM

## 2017-08-18 DIAGNOSIS — N39.3 FEMALE STRESS INCONTINENCE: ICD-10-CM

## 2017-08-18 DIAGNOSIS — R35.0 URINARY FREQUENCY: ICD-10-CM

## 2017-08-18 PROCEDURE — 97161 PT EVAL LOW COMPLEX 20 MIN: CPT | Mod: GP | Performed by: PHYSICAL THERAPIST

## 2017-08-18 PROCEDURE — 97112 NEUROMUSCULAR REEDUCATION: CPT | Mod: GP | Performed by: PHYSICAL THERAPIST

## 2017-08-18 PROCEDURE — 97530 THERAPEUTIC ACTIVITIES: CPT | Mod: GP | Performed by: PHYSICAL THERAPIST

## 2017-08-18 NOTE — PROGRESS NOTES
[facility]  [facility]  Physical Exam  General   [facility], [place]            Physical Therapy Initial Examination/Evaluation August 18, 2017   Dennise Burroughs is a 42 year old female referred to physical therapy by Dr. Frida Chavarria for treatment of pelvic floor dysfunction, myalgia of pelvic floor, urgency-frequency syndrome, female stress incontinence with Precautions/Restrictions/MD instructions No jeromy please. Core strengthening   Therapist Assessment:   Clinical Impression: Pt presents to [facility] with primary complaint of stress incontinence and urgency.  Per clinical examination, pt with hypertonicity in all layers of pelvic floor.  Pt able to perform pelvic floor contraction but with poor endurance due to high baseline resting tone. Pain also noted with palpation to pelvic floor. Decreased core strength also present.   Pt will benefit from skilled physical therapy to improve baseline resting tone and strengthen core and pelvic floor as appropriate.   Subjective: Since pregnancy and delivery, pt is leaking when she runs as well as has increased urgency for urination. Even before pregnancy, pt was going to the restroom often. Pt's daughter is 17 months old.   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 No    Do you leak with cough,sneeze, jumping, running?Yes   Any other activities that cause leaking? No   Do you have triggers that make you feel you can't wait to go to the bathroom? Yes   what are they: Before or after class   Type of pad and number used per day? 0  When you leak what is the amount? Small     How long can you delay the need to urinate? 30 minutes or more.   How many times do you get up to urinate at night? 2-3 to nurse; sometimes going to the bathroom   Can you stop the flow of urine when on the toilet? Yes  Is the volume of urine passed usually: medium. (8sec rule=  250ml with average bladder storing  400-600ml)    Do you strain to pass urine? No  Do you have a slow or hesitant urinary stream? No  Do you have difficulty initiating the urine stream? No  Is urination painful?  No    How many bladder infections have you had in last 12 months? 0    Fluid intake(one glass is 8oz or one cup) 2 glasses/day, 1 caffinated glasses/day  0  alcohol glasses/day.    Bowel habits:  Frequency of bowel movements? 1 times a day  Consistancy of stool? soft  Do you ignore the urge to defecate? Sometimes if busy with baby  Do you strain to pass stool? No    Pelvic Pain:  Do you have any pelvic pain with intercourse, exams, use of tampons? Sometimes with intercourse; has not had intercourse since birth of daughter  Is initial penetration during intercourse painful? Yes, more pain initially  Is deeper penetration painful? Yes  Do you use lubricant?   Yes       Given birth? Yes Any complications? Baby was stressed and pt slow to dilate  # of vaginal delieveries? 0   # of C-sections?1  # of episiotomies? 0.  Are you sexually active?Yes, but not currenlty  Have you ever been  worried for your physical safety? No  Any abdominal or pelvic surgeries?    Are you having any regular exercise? No  Have you practiced the PF(kegel) exercises for 4 or more weeks? no  Changed diet lately?No         MUSCEL PERFORMANCE  Active SLR:Negative  Baseline PF tone:hyper  PF Tone with cough: hyper  Valsalva: hyper  PF Response quality: moderate  PF Power: Center: 3   Endurance: Maximum contraction in seconds: 4  # of endurance contractions before fatigue: NT  Quick contraction repetitions prior to fatigue: 15 with verbal cues.  Specificity/accessory muscles: Abdominals      PALPATION: 1.5- 2.0 finger width rectus diastasis above umbilicus, 1.5 finger width below rectus; pain with palpation in all muscle layers    NMR (15 mins)  Biofeedback unit used to provide visualization of PF activation in multiple body positions including supine, sidelying, and sitting. Education provided on relaxation of pelvic floor due to pt's high resting tone. Discussed strengthening and the importance of neuromuscular control once patient appropriate for strengthening activities. Initiated education on proper body mechanics for lifting and the importance of core strength.    Therapeutic Activity (25 min): Today's session consisted of education regarding pelvic floor muscle anatomy, normal bladder function, urge suppression techniques and/or relaxation techniques as indicated, and instruction in how to complete a bladder diary for assessment next visit.  Pt was instructed in the pathoanatomy of the pelvic floor utilizing pelvic model.  We discussed what pelvic floor physical therapy is, components of exam, and typical patient progression.  Pt was told that she was in control of exam progression, and if at any time was uncomfortable and wished to discontinue we could. Pt involved in session and asking numerous appropriate questions.       Assessment/Plan:      Patient is a 42 year old female with pelvic complaints.     Patient has the following significant findings with corresponding treatment plan.                Diagnosis 1:  Pelvic Floor Dysfunction, Myalgia of pelvic floor, urgency-frequency syndrome, female stress incontinence  Pain -  manual therapy, self management, education and home program  Decreased ROM/flexibility - manual therapy, therapeutic exercise and home program  Decreased strength - therapeutic exercise, therapeutic activities and home program  Impaired muscle performance - biofeedback, neuro re-education and home program  Decreased function - therapeutic activities and home program    Therapy Evaluation Codes:   1) History comprised of:   Personal factors that impact the plan of care:      Age, Anxiety, Past/current experiences and Time since onset of symptoms.    Comorbidity factors that impact the plan of care are:      Bowel/bladder changes and Weakness.     Medications impacting care: None.  2) Examination of Body Systems comprised of:   Body structures and functions that impact the plan of care:      Pelvis.   Activity limitations that impact the plan of care are:      Frequency, Pelvic Exam, Stress incontinence and Urgency.  3) Clinical presentation characteristics are:   Stable/Uncomplicated.  4) Decision-Making    Low complexity using standardized patient assessment instrument and/or measureable assessment of functional outcome.  Cumulative Therapy Evaluation is: Low complexity.    Previous and current functional limitations:  (See Goal Flow Sheet for this information)    Short term and Long term goals: (See Goal Flow Sheet for this information)     Communication ability:  Patient appears to be able to clearly communicate and understand verbal and written communication and follow directions correctly.  Treatment Explanation - The following has been discussed with the patient:   RX ordered/plan of care  Anticipated outcomes  Possible risks and side effects  This patient would benefit from PT  intervention to resume normal activities.   Rehab potential is good.    Frequency:  1 X week, once daily  Duration:  for 8 weeks  Discharge Plan:  Achieve all LTG.  Independent in home treatment program.  Reach maximal therapeutic benefit.    Please refer to the daily flowsheet for treatment today, total treatment time and time spent performing 1:1 timed codes.

## 2017-08-18 NOTE — MR AVS SNAPSHOT
After Visit Summary   8/18/2017    Dennise Burroughs    MRN: [id number]           Patient Information     Date Of Birth          1975        Visit Information        Provider Department      8/18/2017 12:40 PM Delilah Ayala, PT [facility]        Today's Diagnoses     Somatic dysfunction of pelvic region    -  1    Myalgia of pelvic floor        Urinary frequency        Female stress incontinence           Follow-ups after your visit        Your next 10 appointments already scheduled     Aug 31, 2017  9:00 AM CDT   LISE For Women Only with Delilah Ayala, PT   [facility]    [address] [place] [id number] [phone]            Sep 11, 2017  8:10 AM CDT   LISE For Women Only with Delilah Ayala, PT   [facility]    [address] [place] [id number] [phone]              Who to contact     If you have questions or need follow up information about today's clinic visit or your schedule please contact [facility] directly at [phone].  Normal or non-critical lab and imaging results will be communicated to you by En[employer]Surgehart, letter or phone within 4 business days after the clinic has received the results. If you do not hear from us within 7 days, please contact the clinic through Kirus[employer] or phone. If you have a critical or abnormal lab result, we will notify you by phone as soon as possible.  Submit refill requests through [employer] or call your pharmacy and they will forward the refill request to us. Please allow 3 business days for your refill to be completed.          Additional Information About Your Visit        EnzySurgehart Information     [employer] gives you secure access to your electronic health record. If you see a primary care provider, you can also send  messages to your care team and make appointments. If you have questions, please call your primary care clinic.  If you do not have a primary care provider, please call [phone] and they will assist you.        Care EveryWhere ID     This is your Care EveryWhere ID. This could be used by other organizations to access your [place] medical records  [id number]         Blood Pressure from Last 3 Encounters:   08/02/17 112/72   07/08/17 102/60   06/15/17 104/71    Weight from Last 3 Encounters:   08/02/17 52.2 kg (115 lb 1.6 oz)   07/08/17 52.2 kg (115 lb)   06/15/17 52.3 kg (115 lb 6.4 oz)              We Performed the Following     HC PT EVAL, LOW COMPLEXITY     LISE INITIAL EVAL REPORT     NEUROMUSCULAR RE-EDUCATION     THERAPEUTIC ACTIVITIES        Primary Care Provider Office Phone # Fax #    [facility] [phone]       [address] [place] [id number]        Equal Access to Services     SHELDON WANG : Hadii aad ku hadasho Soomaali, waaxda luqadaha, qaybta kaalmada adeegyada, waxay idiin hayaan li alejo . So [facility] [phone].    ATENCIÓN: Si habla español, tiene a griffith disposición servicios gratuitos de asistencia lingüística. [facility] [phone].    We comply with applicable federal civil rights laws and [place] laws. We do not discriminate on the basis of race, color, national origin, age, disability sex, sexual orientation or gender identity.            Thank you!     Thank you for choosing [facility] [place]  for your care. Our goal is always to provide you with excellent care. Hearing back from our patients is one way we can continue to improve our services. Please take a few minutes to complete the written survey that you may receive in the mail after your visit with us. Thank you!             Your Updated Medication List - Protect others around you: Learn how to safely use, store and throw away your medicines at  www.disposemymeds.org.          This list is accurate as of: 8/18/17 11:59 PM.  Always use your most recent med list.                   Brand Name Dispense Instructions for use Diagnosis    calcium-magnesium 500-250 MG Tabs per tablet    CALMAG     Take 1 tablet by mouth daily        PRENATAL VITAMINS PO           SOLUBLE FIBER/PROBIOTICS PO           VITAMIN D (CHOLECALCIFEROL) PO      Take by mouth daily

## 2017-08-20 PROBLEM — R35.0 URINARY FREQUENCY: Status: ACTIVE | Noted: 2017-08-20

## 2017-08-20 PROBLEM — M79.18 MYALGIA OF PELVIC FLOOR: Status: ACTIVE | Noted: 2017-08-20

## 2017-08-20 PROBLEM — M99.05 SOMATIC DYSFUNCTION OF PELVIC REGION: Status: ACTIVE | Noted: 2017-08-20

## 2017-08-20 PROBLEM — N39.3 FEMALE STRESS INCONTINENCE: Status: ACTIVE | Noted: 2017-08-20

## 2017-08-23 ENCOUNTER — THERAPY VISIT (OUTPATIENT)
Dept: PHYSICAL THERAPY | Facility: CLINIC | Age: 42
End: 2017-08-23
Payer: COMMERCIAL

## 2017-08-23 DIAGNOSIS — M79.18 MYALGIA OF PELVIC FLOOR: Primary | ICD-10-CM

## 2017-08-23 DIAGNOSIS — M99.05 SOMATIC DYSFUNCTION OF PELVIC REGION: ICD-10-CM

## 2017-08-23 DIAGNOSIS — R35.0 URINARY FREQUENCY: ICD-10-CM

## 2017-08-23 DIAGNOSIS — N39.3 FEMALE STRESS INCONTINENCE: ICD-10-CM

## 2017-08-23 PROCEDURE — 97110 THERAPEUTIC EXERCISES: CPT | Mod: GP | Performed by: PHYSICAL THERAPIST

## 2017-08-23 PROCEDURE — 97530 THERAPEUTIC ACTIVITIES: CPT | Mod: GP | Performed by: PHYSICAL THERAPIST

## 2017-08-23 NOTE — PROGRESS NOTES
Proximal hip strength 8/23/2017 Left Right   Hip Flexion 3+/5 3+/5   Hip Extension 4-/5 3+/5   Hip Abduction  4+/5 4+/5

## 2017-08-23 NOTE — MR AVS SNAPSHOT
After Visit Summary   8/23/2017    Dennise Burroughs    MRN: [id number]           Patient Information     Date Of Birth          1975        Visit Information        Provider Department      8/23/2017 11:00 AM Delilah Ayala PT [facility]        Today's Diagnoses     Myalgia of pelvic floor    -  1    Female stress incontinence        Somatic dysfunction of pelvic region        Urinary frequency           Follow-ups after your visit        Your next 10 appointments already scheduled     Aug 31, 2017  9:00 AM CDT   LISE For Women Only with Delilah Ayala PT   [facility] ( [facility])    [address] [place] [address] [phone]            Sep 11, 2017  8:10 AM CDT   LISE For Women Only with Delilah Ayala PT   [facility] ( [facility])    [address] [place] [address] [phone]              Who to contact     If you have questions or need follow up information about today's clinic visit or your schedule please contact [facility] directly at [phone].  Normal or non-critical lab and imaging results will be communicated to you by Sand Signhart, letter or phone within 4 business days after the clinic has received the results. If you do not hear from us within 7 days, please contact the clinic through GFS ITt or phone. If you have a critical or abnormal lab result, we will notify you by phone as soon as possible.  Submit refill requests through Womai or call your pharmacy and they will forward the refill request to us. Please allow 3 business days for your refill to be completed.          Additional Information About Your Visit        Sand Signhart Information     Womai gives you secure access to your electronic health record. If you see a primary care provider, you can also send  messages to your care team and make appointments. If you have questions, please call your primary care clinic.  If you do not have a primary care provider, please call [phone] and they will assist you.        Care EveryWhere ID     This is your Care EveryWhere ID. This could be used by other organizations to access your [place] medical records  [id number]         Blood Pressure from Last 3 Encounters:   08/02/17 112/72   07/08/17 102/60   06/15/17 104/71    Weight from Last 3 Encounters:   08/02/17 52.2 kg (115 lb 1.6 oz)   07/08/17 52.2 kg (115 lb)   06/15/17 52.3 kg (115 lb 6.4 oz)              We Performed the Following     THERAPEUTIC ACTIVITIES     THERAPEUTIC EXERCISES        Primary Care Provider Office Phone # Fax #    [facility] [phone]       [address] [place] [address]        Equal Access to Services     SHELDON WANG : Hadii edel connorso Portia, waaxda lutrishadaha, qaybta kaalmada liyamaldonado, kwasi alejo . So [facility] [phone].    ATENCIÓN: Si habla español, tiene a griffith disposición servicios gratuitos de asistencia lingüística. Llame al [phone].    We comply with applicable federal civil rights laws and [place] laws. We do not discriminate on the basis of race, color, national origin, age, disability sex, sexual orientation or gender identity.            Thank you!     Thank you for choosing [facility] PHYSICAL THERAPY [place]  for your care. Our goal is always to provide you with excellent care. Hearing back from our patients is one way we can continue to improve our services. Please take a few minutes to complete the written survey that you may receive in the mail after your visit with us. Thank you!             Your Updated Medication List - Protect others around you: Learn how to safely use, store and throw away your medicines at www.disposemymeds.org.          This list is accurate as of: 8/23/17 12:03 PM.   Always use your most recent med list.                   Brand Name Dispense Instructions for use Diagnosis    calcium-magnesium 500-250 MG Tabs per tablet    CALMAG     Take 1 tablet by mouth daily        PRENATAL VITAMINS PO           SOLUBLE FIBER/PROBIOTICS PO           VITAMIN D (CHOLECALCIFEROL) PO      Take by mouth daily

## 2017-08-31 ENCOUNTER — THERAPY VISIT (OUTPATIENT)
Dept: PHYSICAL THERAPY | Facility: CLINIC | Age: 42
End: 2017-08-31
Payer: COMMERCIAL

## 2017-08-31 DIAGNOSIS — M79.18 MYALGIA OF PELVIC FLOOR: Primary | ICD-10-CM

## 2017-08-31 DIAGNOSIS — N39.3 FEMALE STRESS INCONTINENCE: ICD-10-CM

## 2017-08-31 DIAGNOSIS — R35.0 URINARY FREQUENCY: ICD-10-CM

## 2017-08-31 DIAGNOSIS — M99.05 SOMATIC DYSFUNCTION OF PELVIC REGION: ICD-10-CM

## 2017-08-31 PROCEDURE — 97112 NEUROMUSCULAR REEDUCATION: CPT | Mod: GP | Performed by: PHYSICAL THERAPIST

## 2017-08-31 PROCEDURE — 97530 THERAPEUTIC ACTIVITIES: CPT | Mod: GP | Performed by: PHYSICAL THERAPIST

## 2017-09-11 ENCOUNTER — THERAPY VISIT (OUTPATIENT)
Dept: PHYSICAL THERAPY | Facility: CLINIC | Age: 42
End: 2017-09-11
Payer: COMMERCIAL

## 2017-09-11 DIAGNOSIS — R35.0 URINARY FREQUENCY: ICD-10-CM

## 2017-09-11 DIAGNOSIS — M79.18 MYALGIA OF PELVIC FLOOR: Primary | ICD-10-CM

## 2017-09-11 DIAGNOSIS — N39.3 FEMALE STRESS INCONTINENCE: ICD-10-CM

## 2017-09-11 DIAGNOSIS — M99.05 SOMATIC DYSFUNCTION OF PELVIC REGION: ICD-10-CM

## 2017-09-11 PROCEDURE — 97110 THERAPEUTIC EXERCISES: CPT | Mod: GP | Performed by: PHYSICAL THERAPIST

## 2017-09-11 PROCEDURE — 97112 NEUROMUSCULAR REEDUCATION: CPT | Mod: GP | Performed by: PHYSICAL THERAPIST

## 2017-09-20 ENCOUNTER — ALLIED HEALTH/NURSE VISIT (OUTPATIENT)
Dept: NURSING | Facility: CLINIC | Age: 42
End: 2017-09-20
Payer: COMMERCIAL

## 2017-09-20 DIAGNOSIS — Z23 NEED FOR PROPHYLACTIC VACCINATION AND INOCULATION AGAINST INFLUENZA: Primary | ICD-10-CM

## 2017-09-20 PROCEDURE — 90686 IIV4 VACC NO PRSV 0.5 ML IM: CPT

## 2017-09-20 PROCEDURE — 99207 ZZC NO CHARGE NURSE ONLY: CPT

## 2017-09-20 PROCEDURE — 90471 IMMUNIZATION ADMIN: CPT

## 2017-09-20 NOTE — MR AVS SNAPSHOT
After Visit Summary   9/20/2017    Dennise Burroughs    MRN: [id number]           Patient Information     Date Of Birth          1975        Visit Information        Provider Department      9/20/2017 8:50 AM CARE COORDINATOR LAILA [facility]        Today's Diagnoses     Need for prophylactic vaccination and inoculation against influenza    -  1       Follow-ups after your visit        Your next 10 appointments already scheduled     Sep 25, 2017  8:10 AM CDT   LISE For Women Only with Delilah VEGA Lucy PT   [facility] ( [facility])    [address] [place] [address] [phone]            Oct 09, 2017  8:10 AM CDT   LISE For Women Only with Delilah VEGA Lucy, PT   [facility] ( [facility])    [address] [place] [address] [phone]              Who to contact     If you have questions or need follow up information about today's clinic visit or your schedule please contact [facility] directly at [phone].  Normal or non-critical lab and imaging results will be communicated to you by Prism Analytical Technologieshart, letter or phone within 4 business days after the clinic has received the results. If you do not hear from us within 7 days, please contact the clinic through Singulext or phone. If you have a critical or abnormal lab result, we will notify you by phone as soon as possible.  Submit refill requests through [employer] or call your pharmacy and they will forward the refill request to us. Please allow 3 business days for your refill to be completed.          Additional Information About Your Visit        MyChart Information     [employer] gives you secure access to your electronic health record. If you see a primary care provider, you can also send messages to your care team and make appointments. If you have questions,  please call your primary care clinic.  If you do not have a primary care provider, please call [phone] and they will assist you.        Care EveryWhere ID     This is your Care EveryWhere ID. This could be used by other organizations to access your [place] medical records  [id number]         Blood Pressure from Last 3 Encounters:   08/02/17 112/72   07/08/17 102/60   06/15/17 104/71    Weight from Last 3 Encounters:   08/02/17 115 lb 1.6 oz (52.2 kg)   07/08/17 115 lb (52.2 kg)   06/15/17 115 lb 6.4 oz (52.3 kg)              We Performed the Following     FLU VAC, SPLIT VIRUS IM > 3 YO (QUADRIVALENT) [78683]     Vaccine Administration, Initial [77924]        Primary Care Provider Office Phone # Fax #    [facility] Annelise [phone]       [address] [place] [address]        Equal Access to Services     SHELDON WANG : Hadii aad ku hadasho Soomaali, waaxda luqadaha, qaybta kaalmada adeegyada, waxay idiin hayleighannn li alejo . So [facility] [phone].    ATENCIÓN: Si habla español, tiene a griffith disposición servicios gratuitos de asistencia lingüística. Llame al [phone].    We comply with applicable federal civil rights laws and [place] laws. We do not discriminate on the basis of race, color, national origin, age, disability sex, sexual orientation or gender identity.            Thank you!     Thank you for choosing [facility]  for your care. Our goal is always to provide you with excellent care. Hearing back from our patients is one way we can continue to improve our services. Please take a few minutes to complete the written survey that you may receive in the mail after your visit with us. Thank you!             Your Updated Medication List - Protect others around you: Learn how to safely use, store and throw away your medicines at www.disposemymeds.org.          This list is accurate as of: 9/20/17 10:26 AM.  Always use your most recent med  list.                   Brand Name Dispense Instructions for use Diagnosis    calcium-magnesium 500-250 MG Tabs per tablet    CALMAG     Take 1 tablet by mouth daily        PRENATAL VITAMINS PO           SOLUBLE FIBER/PROBIOTICS PO           VITAMIN D (CHOLECALCIFEROL) PO      Take by mouth daily

## 2017-09-20 NOTE — PROGRESS NOTES
Injectable Influenza Immunization Documentation    1.  Is the person to be vaccinated sick today?   No    2. Does the person to be vaccinated have an allergy to a component   of the vaccine?   No    3. Has the person to be vaccinated ever had a serious reaction   to influenza vaccine in the past?   No    4. Has the person to be vaccinated ever had Guillain-Barré syndrome?   No    Form completed by self

## 2017-09-25 ENCOUNTER — THERAPY VISIT (OUTPATIENT)
Dept: PHYSICAL THERAPY | Facility: CLINIC | Age: 42
End: 2017-09-25
Payer: COMMERCIAL

## 2017-09-25 DIAGNOSIS — R35.0 URINARY FREQUENCY: ICD-10-CM

## 2017-09-25 DIAGNOSIS — M79.18 MYALGIA OF PELVIC FLOOR: Primary | ICD-10-CM

## 2017-09-25 DIAGNOSIS — N39.3 FEMALE STRESS INCONTINENCE: ICD-10-CM

## 2017-09-25 DIAGNOSIS — M99.05 SOMATIC DYSFUNCTION OF PELVIC REGION: ICD-10-CM

## 2017-09-25 PROCEDURE — 97110 THERAPEUTIC EXERCISES: CPT | Mod: GP | Performed by: PHYSICAL THERAPIST

## 2017-09-25 PROCEDURE — 97112 NEUROMUSCULAR REEDUCATION: CPT | Mod: GP | Performed by: PHYSICAL THERAPIST

## 2017-10-09 ENCOUNTER — THERAPY VISIT (OUTPATIENT)
Dept: PHYSICAL THERAPY | Facility: CLINIC | Age: 42
End: 2017-10-09
Payer: COMMERCIAL

## 2017-10-09 DIAGNOSIS — R35.0 URINARY FREQUENCY: ICD-10-CM

## 2017-10-09 DIAGNOSIS — M79.18 MYALGIA OF PELVIC FLOOR: Primary | ICD-10-CM

## 2017-10-09 DIAGNOSIS — M99.05 SOMATIC DYSFUNCTION OF PELVIC REGION: ICD-10-CM

## 2017-10-09 DIAGNOSIS — N39.3 FEMALE STRESS INCONTINENCE: ICD-10-CM

## 2017-10-09 PROCEDURE — 97112 NEUROMUSCULAR REEDUCATION: CPT | Mod: GP | Performed by: PHYSICAL THERAPIST

## 2017-10-09 PROCEDURE — 97110 THERAPEUTIC EXERCISES: CPT | Mod: GP | Performed by: PHYSICAL THERAPIST

## 2017-10-09 NOTE — MR AVS SNAPSHOT
After Visit Summary   10/9/2017    Dennise Burroughs    MRN: [id number]           Patient Information     Date Of Birth          1975        Visit Information        Provider Department      10/9/2017 8:10 AM Delilah Ayala PT [facility] [place]        Today's Diagnoses     Myalgia of pelvic floor    -  1    Female stress incontinence        Somatic dysfunction of pelvic region        Urinary frequency           Follow-ups after your visit        Your next 10 appointments already scheduled     Oct 24, 2017  8:10 AM CDT   LISE For Women Only with Delilah Ayala PT   [facility] [place] ( [facility])    [address]  [address]  [facility] [id number] [phone]            Jan 03, 2018 10:30 AM CST   Return Visit with Frida Chavarria MD   [facility] ([facility])    [facility]  [address] [place] [address], [address], [address]  [facility] [address] [phone]              Who to contact     If you have questions or need follow up information about today's clinic visit or your schedule please contact [facility] directly at [phone].  Normal or non-critical lab and imaging results will be communicated to you by MyChart, letter or phone within 4 business days after the clinic has received the results. If you do not hear from us within 7 days, please contact the clinic through AudioPixelshart or phone. If you have a critical or abnormal lab result, we will notify you by phone as soon as possible.  Submit refill requests through Stream Media or call your pharmacy and they will forward the refill request to us. Please allow 3 business days for your refill to be completed.          Additional Information About Your Visit        MyChart Information     Stream Media gives you secure access to your electronic health record. If you see a primary care provider, you  can also send messages to your care team and make appointments. If you have questions, please call your primary care clinic.  If you do not have a primary care provider, please call [phone] and they will assist you.        Care EveryWhere ID     This is your Care EveryWhere ID. This could be used by other organizations to access your [place] medical records  [id number]         Blood Pressure from Last 3 Encounters:   08/02/17 112/72   07/08/17 102/60   06/15/17 104/71    Weight from Last 3 Encounters:   08/02/17 52.2 kg (115 lb 1.6 oz)   07/08/17 52.2 kg (115 lb)   06/15/17 52.3 kg (115 lb 6.4 oz)              We Performed the Following     LISE PROGRESS NOTES REPORT     NEUROMUSCULAR RE-EDUCATION     THERAPEUTIC EXERCISES        Primary Care Provider Office Phone # Fax #    [facility] [phone]       [address] [place] [address]        Equal Access to Services     SHELDON WANG : Hadii aad ku hadasho Soomaali, waaxda luqadaha, qaybta kaalmada adeegyada, waxay idiin hayleighannn li alejo . So [facility] [phone].    ATENCIÓN: Si habla español, tiene a griffith disposición servicios gratuitos de asistencia lingüística. [facility] [phone].    We comply with applicable federal civil rights laws and [place] laws. We do not discriminate on the basis of race, color, national origin, age, disability, sex, sexual orientation, or gender identity.            Thank you!     Thank you for choosing [facility] [place]  for your care. Our goal is always to provide you with excellent care. Hearing back from our patients is one way we can continue to improve our services. Please take a few minutes to complete the written survey that you may receive in the mail after your visit with us. Thank you!             Your Updated Medication List - Protect others around you: Learn how to safely use, store and throw away your medicines at www.disposemymeds.org.           This list is accurate as of: 10/9/17  1:59 PM.  Always use your most recent med list.                   Brand Name Dispense Instructions for use Diagnosis    calcium-magnesium 500-250 MG Tabs per tablet    CALMAG     Take 1 tablet by mouth daily        PRENATAL VITAMINS PO           SOLUBLE FIBER/PROBIOTICS PO           VITAMIN D (CHOLECALCIFEROL) PO      Take by mouth daily

## 2017-10-09 NOTE — PROGRESS NOTES
[facility]  System  Physical Exam  General   ROS      PROGRESS  REPORT    Progress reporting period is from 8/18/2017 to 10/9/2017.       SUBJECTIVE  Subjective: Pt reports that abdominals are feeling stronger. She is able to dance with daughter without stress incontinence. Has had more issues with urgency/urge incontinnece. Pt reports it is difficult to get to exercises lately due to her daughter teething and being more needy at home and daily life stressors    Current Pain level: 0/10.     Initial Pain level: 0/10.   Changes in function:  Yes (See Goal flowsheet attached for changes in current functional level)  Adverse reaction to treatment or activity: None    OBJECTIVE  Changes noted in objective findings:        MUSCLE PERFORMANCE 10/9/2017  Baseline PF tone: hypo  PF Tone with cough: NT  Valsalva: NT  PF Response quality: normal   PF Power: Center:  4/5  Endurance: Maximum contraction in seconds: 10s but decreased strength after 6s  # of endurance contractions before fatigue: NT  Quick contraction repetitions prior to fatigue: 15  Specificity/accessory muscles: Improved isolation     MUSCLE PERFORMANCE 8/18/2017  Active SLR:Negative  Baseline PF tone:hyper  PF Tone with cough: hyper  Valsalva: hyper  PF Response quality: moderate  PF Power: Center: 3   Endurance: Maximum contraction in seconds: 4  # of endurance contractions before fatigue: NT  Quick contraction repetitions prior to fatigue: 15 with verbal cues.  Specificity/accessory muscles: Abdominals  Objective: Assessed strength for PN; transition to working on longer PF holds     ASSESSMENT/PLAN  Updated problem list and treatment plan: Diagnosis 1:  Urinary incontinence  Decreased strength - therapeutic exercise, therapeutic activities and home program  Impaired muscle performance - biofeedback, neuro re-education and home program  Decreased function - therapeutic activities and home program  STG/LTGs have been met or progress has been made towards goals:   Yes (See Goal flow sheet completed today.)  Assessment of Progress: The patient's condition is improving.  Self Management Plans:  Patient has been instructed in a home treatment program.  I have re-evaluated this patient and find that the nature, scope, duration and intensity of the therapy is appropriate for the medical condition of the patient.  Dennise continues to require the following intervention to meet STG and LTG's:  PT    Recommendations:  This patient would benefit from continued therapy.     Frequency:  1 X/ 2 week, once daily  Duration:  for 4 weeks      Please refer to the daily flowsheet for treatment today, total treatment time and time spent performing 1:1 timed codes.

## 2017-10-24 ENCOUNTER — THERAPY VISIT (OUTPATIENT)
Dept: PHYSICAL THERAPY | Facility: CLINIC | Age: 42
End: 2017-10-24
Payer: COMMERCIAL

## 2017-10-24 DIAGNOSIS — N39.3 FEMALE STRESS INCONTINENCE: ICD-10-CM

## 2017-10-24 DIAGNOSIS — M79.18 MYALGIA OF PELVIC FLOOR: Primary | ICD-10-CM

## 2017-10-24 DIAGNOSIS — R35.0 URINARY FREQUENCY: ICD-10-CM

## 2017-10-24 DIAGNOSIS — M99.05 SOMATIC DYSFUNCTION OF PELVIC REGION: ICD-10-CM

## 2017-10-24 PROCEDURE — 97110 THERAPEUTIC EXERCISES: CPT | Mod: GP | Performed by: PHYSICAL THERAPIST

## 2017-10-24 PROCEDURE — 97112 NEUROMUSCULAR REEDUCATION: CPT | Mod: GP | Performed by: PHYSICAL THERAPIST

## 2017-11-28 ENCOUNTER — THERAPY VISIT (OUTPATIENT)
Dept: PHYSICAL THERAPY | Facility: CLINIC | Age: 42
End: 2017-11-28
Payer: COMMERCIAL

## 2017-11-28 DIAGNOSIS — M79.18 MYALGIA OF PELVIC FLOOR: Primary | ICD-10-CM

## 2017-11-28 DIAGNOSIS — R35.0 URINARY FREQUENCY: ICD-10-CM

## 2017-11-28 DIAGNOSIS — N39.3 FEMALE STRESS INCONTINENCE: ICD-10-CM

## 2017-11-28 DIAGNOSIS — M99.05 SOMATIC DYSFUNCTION OF PELVIC REGION: ICD-10-CM

## 2017-11-28 PROCEDURE — 97112 NEUROMUSCULAR REEDUCATION: CPT | Mod: GP | Performed by: PHYSICAL THERAPIST

## 2017-11-28 PROCEDURE — 97530 THERAPEUTIC ACTIVITIES: CPT | Mod: GP | Performed by: PHYSICAL THERAPIST

## 2017-11-28 NOTE — MR AVS SNAPSHOT
After Visit Summary   11/28/2017    Dennise Burroughs    MRN: [id number]           Patient Information     Date Of Birth          1975        Visit Information        Provider Department      11/28/2017 8:10 AM Delilah Ayala, PT [facility] [place]        Today's Diagnoses     Myalgia of pelvic floor    -  1    Female stress incontinence        Somatic dysfunction of pelvic region        Urinary frequency           Follow-ups after your visit        Your next 10 appointments already scheduled     Dec 12, 2017  8:50 AM CST   LISE For Women Only with Delilah Ayala PT   [facility] [place] ( [facility])    [address] [place] [address] [phone]            Dec 27, 2017  8:50 AM CST   LISE For Women Only with Delilah Ayala PT   [facility] [place] ( [facility])    [address] [place] [id number] [phone]            Jan 03, 2018 10:30 AM CST   Return Visit with Frida Chavarria MD   [facility] ([facility])    [facility]  [address] [place] [address], [address], [address]  [facility] [address] [phone]              Who to contact     If you have questions or need follow up information about today's clinic visit or your schedule please contact [facility] PHYSICAL THERAPY [place] directly at [phone].  Normal or non-critical lab and imaging results will be communicated to you by MyChart, letter or phone within 4 business days after the clinic has received the results. If you do not hear from us within 7 days, please contact the clinic through MyChart or phone. If you have a critical or abnormal lab result, we will notify you by phone as soon as possible.  Submit refill requests through [email or web address] or call your pharmacy and they will forward the refill request to us. Please allow  3 business days for your refill to be completed.          Additional Information About Your Visit        MyChart Information     GoInstanthart gives you secure access to your electronic health record. If you see a primary care provider, you can also send messages to your care team and make appointments. If you have questions, please call your primary care clinic.  If you do not have a primary care provider, please call [phone] and they will assist you.        Care EveryWhere ID     This is your Care EveryWhere ID. This could be used by other organizations to access your [place] medical records  [id number]         Blood Pressure from Last 3 Encounters:   08/02/17 112/72   07/08/17 102/60   06/15/17 104/71    Weight from Last 3 Encounters:   08/02/17 52.2 kg (115 lb 1.6 oz)   07/08/17 52.2 kg (115 lb)   06/15/17 52.3 kg (115 lb 6.4 oz)              We Performed the Following     NEUROMUSCULAR RE-EDUCATION     THERAPEUTIC ACTIVITIES        Primary Care Provider Office Phone # Fax #    [facility] [phone]       [address] [place] [address]        Equal Access to Services     EVIN [facility] Hospital[facility]UDMILA : Hadii edel reyes Sojeannie, waaxda lualbina, qaybta kaalmada silvino, kwasi alejo . So [facility] [phone].    ATENCIÓN: Si habla español, tiene a griffith disposición servicios gratuitos de asistencia lingüística. [facility] [phone].    We comply with applicable federal civil rights laws and [place] laws. We do not discriminate on the basis of race, color, national origin, age, disability, sex, sexual orientation, or gender identity.            Thank you!     Thank you for choosing [facility] [place]  for your care. Our goal is always to provide you with excellent care. Hearing back from our patients is one way we can continue to improve our services. Please take a few minutes to complete the written survey that you may receive in the  mail after your visit with us. Thank you!             Your Updated Medication List - Protect others around you: Learn how to safely use, store and throw away your medicines at www.disposemymeds.org.          This list is accurate as of: 11/28/17 10:38 AM.  Always use your most recent med list.                   Brand Name Dispense Instructions for use Diagnosis    calcium-magnesium 500-250 MG Tabs per tablet    CALMAG     Take 1 tablet by mouth daily        PRENATAL VITAMINS PO           SOLUBLE FIBER/PROBIOTICS PO           VITAMIN D (CHOLECALCIFEROL) PO      Take by mouth daily

## 2017-11-28 NOTE — PROGRESS NOTES
[facility]  System  Physical Exam  General   ROS      PROGRESS  REPORT    Progress reporting period is from 8/18/2017 to 11/28/2017.       SUBJECTIVE  Subjective: Patient reports after last session she did exercises for 2 weeks and then had difficulty due to sickness. Pt has noticed some incontinence or urgency when brushing her teeth as well as before, after, or between classes.     Current pain level is 0/10  .     Initial Pain level: 0/10.   Changes in function:  Yes; Goals updated to reflect pt's current level of function  Adverse reaction to treatment or activity: None    OBJECTIVE  Changes noted in objective findings:    MUSCLE PERFORMANCE 11/28/2017  Baseline PF tone: WNL  PF Tone with cough: NT  Valsalva: NT  PF Response quality: normal   PF Power: Center:  4+/5  Endurance: Maximum contraction in seconds: 10s   # of endurance contractions before fatigue: NT  Quick contraction repetitions prior to fatigue: >15  MUSCLE PERFORMANCE 10/9/2017  Baseline PF tone: hypo  PF Tone with cough: NT  Valsalva: NT  PF Response quality: normal   PF Power: Center:  4/5  Endurance: Maximum contraction in seconds: 10s but decreased strength after 6s  # of endurance contractions before fatigue: NT  Quick contraction repetitions prior to fatigue: 15    Objective: Reviewed HEP; focus on relaxation of PF; improved relaxation with practice; biofeedback used for visualization      ASSESSMENT/PLAN  Updated problem list and treatment plan: Diagnosis 1:  Stress Incontinence, urinary urgency  Decreased strength - therapeutic exercise, therapeutic activities and home program  Impaired muscle performance - biofeedback, neuro re-education and home program  Decreased function - therapeutic activities and home program  STG/LTGs have been met or progress has been made towards goals:  Yes (See Goal flow sheet completed today.)  Assessment of Progress: The patient's condition is improving.  Self Management Plans:  Patient has been instructed in a  home treatment program.  I have re-evaluated this patient and find that the nature, scope, duration and intensity of the therapy is appropriate for the medical condition of the patient.  Dennise continues to require the following intervention to meet STG and LTG's:  PT    Recommendations:  This patient would benefit from continued therapy.     Frequency:  1 X/2 week, once daily  Duration:  for 6 weeks    Please refer to the daily flowsheet for treatment today, total treatment time and time spent performing 1:1 timed codes.

## 2017-12-12 ENCOUNTER — THERAPY VISIT (OUTPATIENT)
Dept: PHYSICAL THERAPY | Facility: CLINIC | Age: 42
End: 2017-12-12
Payer: COMMERCIAL

## 2017-12-12 DIAGNOSIS — R35.0 URINARY FREQUENCY: ICD-10-CM

## 2017-12-12 DIAGNOSIS — M99.05 SOMATIC DYSFUNCTION OF PELVIC REGION: ICD-10-CM

## 2017-12-12 DIAGNOSIS — N39.3 FEMALE STRESS INCONTINENCE: ICD-10-CM

## 2017-12-12 DIAGNOSIS — M79.18 MYALGIA OF PELVIC FLOOR: ICD-10-CM

## 2017-12-12 PROCEDURE — 97112 NEUROMUSCULAR REEDUCATION: CPT | Mod: GP | Performed by: PHYSICAL THERAPIST

## 2017-12-27 ENCOUNTER — THERAPY VISIT (OUTPATIENT)
Dept: PHYSICAL THERAPY | Facility: CLINIC | Age: 42
End: 2017-12-27
Payer: COMMERCIAL

## 2017-12-27 DIAGNOSIS — M79.18 MYALGIA OF PELVIC FLOOR: ICD-10-CM

## 2017-12-27 DIAGNOSIS — M99.05 SOMATIC DYSFUNCTION OF PELVIC REGION: ICD-10-CM

## 2017-12-27 DIAGNOSIS — R35.0 URINARY FREQUENCY: ICD-10-CM

## 2017-12-27 DIAGNOSIS — N39.3 FEMALE STRESS INCONTINENCE: ICD-10-CM

## 2017-12-27 PROCEDURE — 97110 THERAPEUTIC EXERCISES: CPT | Mod: GP | Performed by: PHYSICAL THERAPIST

## 2017-12-27 PROCEDURE — 97112 NEUROMUSCULAR REEDUCATION: CPT | Mod: GP | Performed by: PHYSICAL THERAPIST

## 2017-12-27 NOTE — PROGRESS NOTES
[facility]  [facility]  Physical Exam  General   ROS    PROGRESS  REPORT    Progress reporting period is from 8/18/2017 to 12/27/2017.       SUBJECTIVE  Subjective: Patient reports symptoms are better with urgency. Patient reports some slight incontinence with jogging with stroller. She overall feels much stronger.     Current Pain level: 0/10.     initial Pain level: 0/10.   Changes in function:  Yes (See Goal flowsheet attached for changes in current functional level)  Adverse reaction to treatment or activity: None    OBJECTIVE  Changes noted in objective findings:  Yes,     MUSCLE PERFORMANCE 12/27/2017  Active SLR:Negative  Baseline PF tone: WNL  PF Tone with cough: WNL  Valsalva: WNL   PF Response quality: Good  PF Power: Center: 4+/5  Endurance: Maximum contraction in seconds: 10+  # of endurance contractions before fatigue: NT  Quick contraction repetitions prior to fatigue: >10  Specificity/accessory muscles: Much improved isolation   Objective: Improved strength and neuromuscular control of both core and pelvic floor     MUSCEL PERFORMANCE 8/18/2017  Active SLR:Negative  Baseline PF tone:hyper  PF Tone with cough: hyper  Valsalva: hyper  PF Response quality: moderate  PF Power: Center: 3   Endurance: Maximum contraction in seconds: 4  # of endurance contractions before fatigue: NT  Quick contraction repetitions prior to fatigue: 15 with verbal cues.  Specificity/accessory muscles: Abdominals          ASSESSMENT/PLAN  Updated problem list and treatment plan: Diagnosis 1:  Stress incontinence/Mixed Incontinence  Impaired muscle performance - biofeedback, neuro re-education and home program  Decreased function - therapeutic activities and home program  STG/LTGs have been met or progress has been made towards goals:  Yes (See Goal flow sheet completed today.)  Assessment of Progress: The patient's condition is improving.  Self Management Plans:  Patient has been instructed in a home treatment program.  I have  re-evaluated this patient and find that the nature, scope, duration and intensity of the therapy is appropriate for the medical condition of the patient.  Dennise continues to require the following intervention to meet STG and LTG's:  PT    Recommendations:  This patient would benefit from continued therapy.     Frequency:  1 X/2 week, once daily  Duration:  for 2 weeks          Please refer to the daily flowsheet for treatment today, total treatment time and time spent performing 1:1 timed codes.

## 2017-12-27 NOTE — MR AVS SNAPSHOT
After Visit Summary   12/27/2017    Dennise Burroughs    MRN: [id number]           Patient Information     Date Of Birth          1975        Visit Information        Provider Department      12/27/2017 8:50 AM Delilah Ayala PT [facility] Physical Therapy [place]        Today's Diagnoses     Female stress incontinence        Somatic dysfunction of pelvic region        Urinary frequency        Myalgia of pelvic floor           Follow-ups after your visit        Your next 10 appointments already scheduled     Jan 03, 2018 10:30 AM CST   Return Visit with Frida Chavarria MD   [facility] ([facility])    [facility]  [address] [place], [place] [address]e [address], [address], [address]  [facility] [id number] [phone]            Ravindra 10, 2018 11:30 AM CST   LISE For Women Only with Delilah Ayala PT   [facility] ( [facility])    [address]  [address]  [facility] [address] [phone]              Who to contact     If you have questions or need follow up information about today's clinic visit or your schedule please contact [facility] [place] directly at [phone].  Normal or non-critical lab and imaging results will be communicated to you by MyChart, letter or phone within 4 business days after the clinic has received the results. If you do not hear from us within 7 days, please contact the clinic through Getting-inhart or phone. If you have a critical or abnormal lab result, we will notify you by phone as soon as possible.  Submit refill requests through [employer] or call your pharmacy and they will forward the refill request to us. Please allow 3 business days for your refill to be completed.          Additional Information About Your Visit        MyChart Information     [employer] gives you secure access to your electronic health record. If you see a primary care provider, you can  also send messages to your care team and make appointments. If you have questions, please call your primary care clinic.  If you do not have a primary care provider, please call [phone] and they will assist you.        Care EveryWhere ID     This is your Care EveryWhere ID. This could be used by other organizations to access your [place] medical records  [id number]         Blood Pressure from Last 3 Encounters:   08/02/17 112/72   07/08/17 102/60   06/15/17 104/71    Weight from Last 3 Encounters:   08/02/17 52.2 kg (115 lb 1.6 oz)   07/08/17 52.2 kg (115 lb)   06/15/17 52.3 kg (115 lb 6.4 oz)              We Performed the Following     LISE PROGRESS NOTES REPORT     NEUROMUSCULAR RE-EDUCATION     THERAPEUTIC EXERCISES        Primary Care Provider Office Phone # Fax #    Md [facility] MD Annelise [phone]       [address] [place] [address]        Equal Access to Services     SHELDON WANG : Hadii aad ku hadasho Soomaali, waaxda luqadaha, qaybta kaalmada adeegyada, waxay idiin hayleighannn li alejo . So [facility] [phone].    ATENCIÓN: Si habla español, tiene a griffith disposición servicios gratuitos de asistencia lingüística. [facility] [phone].    We comply with applicable federal civil rights laws and [place] laws. We do not discriminate on the basis of race, color, national origin, age, disability, sex, sexual orientation, or gender identity.            Thank you!     Thank you for choosing [facility] [place]  for your care. Our goal is always to provide you with excellent care. Hearing back from our patients is one way we can continue to improve our services. Please take a few minutes to complete the written survey that you may receive in the mail after your visit with us. Thank you!             Your Updated Medication List - Protect others around you: Learn how to safely use, store and throw away your medicines at www.disposemymeds.org.           This list is accurate as of: 12/27/17  9:58 AM.  Always use your most recent med list.                   Brand Name Dispense Instructions for use Diagnosis    calcium-magnesium 500-250 MG Tabs per tablet    CALMAG     Take 1 tablet by mouth daily        PRENATAL VITAMINS PO           SOLUBLE FIBER/PROBIOTICS PO           VITAMIN D (CHOLECALCIFEROL) PO      Take by mouth daily

## 2018-01-03 ENCOUNTER — OFFICE VISIT (OUTPATIENT)
Dept: UROLOGY | Facility: CLINIC | Age: 43
End: 2018-01-03
Attending: UROLOGY
Payer: COMMERCIAL

## 2018-01-03 VITALS
DIASTOLIC BLOOD PRESSURE: 66 MMHG | HEIGHT: 63 IN | HEART RATE: 82 BPM | BODY MASS INDEX: 19.99 KG/M2 | WEIGHT: 112.8 LBS | SYSTOLIC BLOOD PRESSURE: 97 MMHG

## 2018-01-03 DIAGNOSIS — M62.89 PELVIC FLOOR DYSFUNCTION: ICD-10-CM

## 2018-01-03 DIAGNOSIS — R39.15 URINARY URGENCY: ICD-10-CM

## 2018-01-03 DIAGNOSIS — N39.3 STRESS INCONTINENCE: Primary | ICD-10-CM

## 2018-01-03 PROCEDURE — 57160 INSERT PESSARY/OTHER DEVICE: CPT | Mod: ZF | Performed by: UROLOGY

## 2018-01-03 PROCEDURE — G0463 HOSPITAL OUTPT CLINIC VISIT: HCPCS | Mod: ZF

## 2018-01-03 NOTE — NURSING NOTE
Chief Complaint   Patient presents with     Follow Up For     Follwo up urinary urgency and incontinence. Problem is much better but still there on and off.       See MIC Jarrett 1/3/2018

## 2018-01-03 NOTE — MR AVS SNAPSHOT
After Visit Summary   1/3/2018    Dennise Burroughs    MRN: [id number]           Patient Information     Date Of Birth          1975        Visit Information        Provider Department      1/3/2018 10:30 AM Frida Chavarria MD [facility]        Today's Diagnoses     Mixed incontinence    -  1    Pelvic floor dysfunction          Care Instructions    Thursday January 11 at 10am [facility] [address] [place] [address] Clinic 4A          Follow-ups after your visit        Your next 10 appointments already scheduled     Ravindra 10, 2018 11:30 AM CST   LISE For Women Only with Delilah Ayala PT   [facility] ( [facility])    [address]  [address] [address] [phone]              Who to contact     Please call your clinic at [phone] to:    Ask questions about your health    Make or cancel appointments    Discuss your medicines    Learn about your test results    Speak to your doctor   If you have compliments or concerns about an experience at your clinic, or if you wish to file a complaint, please contact [facility] Patient Relations at [phone] or email us at [email or web address]         Additional Information About Your Visit        MyChart Information     [employer] gives you secure access to your electronic health record. If you see a primary care provider, you can also send messages to your care team and make appointments. If you have questions, please call your primary care clinic.  If you do not have a primary care provider, please call [phone] and they will assist you.      [employer] is an electronic gateway that provides easy, online access to your medical records. With [employer], you can request a clinic appointment, read your test results, renew a prescription or communicate with your care team.     To access your existing account, please  "contact your [facility] or call [phone] for assistance.        Care EveryWhere ID     This is your Care EveryWhere ID. This could be used by other organizations to access your [place] medical records  [id number]        Your Vitals Were     Pulse Height Last Period Breastfeeding? BMI (Body Mass Index)       82 1.6 m (5' 3\") 01/02/2018 Yes 19.98 kg/m2        Blood Pressure from Last 3 Encounters:   01/03/18 97/66   08/02/17 112/72   07/08/17 102/60    Weight from Last 3 Encounters:   01/03/18 51.2 kg (112 lb 12.8 oz)   08/02/17 52.2 kg (115 lb 1.6 oz)   07/08/17 52.2 kg (115 lb)              We Performed the Following     FIT/INSERT INTRAVAG SUPPORT DEVICE/PESSARY        Primary Care Provider Office Phone # Fax #    [facility] [phone]       [address] [place] [address]        Equal Access to Services     SHELDON WANG : Hadii edel ku hadasho Soomaali, waaxda luqadaha, qaybta kaalmada adeegyada, kwasi alejo . So [facility] [phone].    ATENCIÓN: Si habla español, tiene a griffith disposición servicios gratuitos de asistencia lingüística. [facility] [phone].    We comply with applicable federal civil rights laws and [place] laws. We do not discriminate on the basis of race, color, national origin, age, disability, sex, sexual orientation, or gender identity.            Thank you!     Thank you for choosing [facility]  for your care. Our goal is always to provide you with excellent care. Hearing back from our patients is one way we can continue to improve our services. Please take a few minutes to complete the written survey that you may receive in the mail after your visit with us. Thank you!             Your Updated Medication List - Protect others around you: Learn how to safely use, store and throw away your medicines at www.disposemymeds.org.          This list is accurate as of: 1/3/18 11:26 " AM.  Always use your most recent med list.                   Brand Name Dispense Instructions for use Diagnosis    calcium-magnesium 500-250 MG Tabs per tablet    CALMAG     Take 1 tablet by mouth daily        PRENATAL VITAMINS PO           SOLUBLE FIBER/PROBIOTICS PO           VITAMIN D (CHOLECALCIFEROL) PO      Take by mouth daily

## 2018-01-03 NOTE — PROGRESS NOTES
"January 3, 2018    Return visit    Patient returns today for follow up.  She went to pelvic floor therapy and notes some improvement in her symptoms but still with some small volume leakage with heavy lifting or exercise.  Also notes some urgency, worse when she is working, better right now because she is off for winter break.   She is tearful at times about her persistent leakage.  She denies any changes in her health since last visit.    BP 97/66 (BP Location: Right arm, Patient Position: Chair)  Pulse 82  Ht 1.6 m (5' 3\")  Wt 51.2 kg (112 lb 12.8 oz)  LMP 01/02/2018  Breastfeeding? Yes  BMI 19.98 kg/m2  She is comfortable, in no distress, non-labored breathing.  Abdomen is soft, non-tender, non-distended.  Normal external female genitalia.  Negative CST.      She was initially attempted to be fit with a #2 incontinence dish but too painful but able to tolerate a #2 ring.      A/P: 42 year old F with stress incontinence, urinary urgency, pelvic floor dysfunction    Would consider trial of pessary but do not have any incontinence rings to send her home with today.  She will return next week to see if she is better suited for an incontinence ring, may need to downsize to a #1    25 minutes were spent with the patient today, > 50% in counseling and coordination of care    Frida Chavarria MD MPH   of Urology    CC  Patient Care Team:  Wilbur Castelan Md, MD as PCP - General  SELF, REFERRED              "

## 2018-01-03 NOTE — LETTER
"1/3/2018       RE: Dennise Burroughs  [address]   [place] [address]     Dear Colleague,    Thank you for referring your patient, Dennise Burroughs, to the [facility] at [facility]. Please see a copy of my visit note below.    January 3, 2018    Return visit    Patient returns today for follow up.  She went to pelvic floor therapy and notes some improvement in her symptoms but still with some small volume leakage with heavy lifting or exercise.  Also notes some urgency, worse when she is working, better right now because she is off for winter break.   She is tearful at times about her persistent leakage.  She denies any changes in her health since last visit.    BP 97/66 (BP Location: Right arm, Patient Position: Chair)  Pulse 82  Ht 1.6 m (5' 3\")  Wt 51.2 kg (112 lb 12.8 oz)  LMP 01/02/2018  Breastfeeding? Yes  BMI 19.98 kg/m2  She is comfortable, in no distress, non-labored breathing.  Abdomen is soft, non-tender, non-distended.  Normal external female genitalia.  Negative CST.      She was initially attempted to be fit with a #2 incontinence dish but too painful but able to tolerate a #2 ring.      A/P: 42 year old F with stress incontinence, urinary urgency, pelvic floor dysfunction    Would consider trial of pessary but do not have any incontinence rings to send her home with today.  She will return next week to see if she is better suited for an incontinence ring, may need to downsize to a #1    25 minutes were spent with the patient today, > 50% in counseling and coordination of care    Frida Chavarria MD MPH   of Urology    CC  Patient Care Team:  [facility] Md Annelise, MD as PCP - General  SELF, REFERRED              "

## 2018-01-04 ENCOUNTER — PRE VISIT (OUTPATIENT)
Dept: UROLOGY | Facility: CLINIC | Age: 43
End: 2018-01-04

## 2018-01-10 ENCOUNTER — THERAPY VISIT (OUTPATIENT)
Dept: PHYSICAL THERAPY | Facility: CLINIC | Age: 43
End: 2018-01-10
Payer: COMMERCIAL

## 2018-01-10 DIAGNOSIS — M79.18 MYALGIA OF PELVIC FLOOR: ICD-10-CM

## 2018-01-10 DIAGNOSIS — N39.3 FEMALE STRESS INCONTINENCE: ICD-10-CM

## 2018-01-10 DIAGNOSIS — M99.05 SOMATIC DYSFUNCTION OF PELVIC REGION: ICD-10-CM

## 2018-01-10 DIAGNOSIS — R35.0 URINARY FREQUENCY: ICD-10-CM

## 2018-01-10 PROCEDURE — 97110 THERAPEUTIC EXERCISES: CPT | Mod: GP | Performed by: PHYSICAL THERAPIST

## 2018-01-10 PROCEDURE — 97530 THERAPEUTIC ACTIVITIES: CPT | Mod: GP | Performed by: PHYSICAL THERAPIST

## 2018-01-10 PROCEDURE — 97112 NEUROMUSCULAR REEDUCATION: CPT | Mod: GP | Performed by: PHYSICAL THERAPIST

## 2018-01-10 NOTE — MR AVS SNAPSHOT
After Visit Summary   1/10/2018    Dennise Burroughs    MRN: [id number]           Patient Information     Date Of Birth          1975        Visit Information        Provider Department      1/10/2018 11:30 AM Delilah Ayala, PT [facility] Physical [facility] [place]        Today's Diagnoses     Female stress incontinence        Somatic dysfunction of pelvic region        Urinary frequency        Myalgia of pelvic floor           Follow-ups after your visit        Your next 10 appointments already scheduled     Feb 07, 2018  9:30 AM CST   Return Visit with Frida Chavarria MD   [facility] ([facility])    [facility]  [address]  [facility] [address] [phone]              Who to contact     If you have questions or need follow up information about today's clinic visit or your schedule please contact [facility] [place] directly at [phone].  Normal or non-critical lab and imaging results will be communicated to you by CREATIVhart, letter or phone within 4 business days after the clinic has received the results. If you do not hear from us within 7 days, please contact the clinic through CREAT[employer] or phone. If you have a critical or abnormal lab result, we will notify you by phone as soon as possible.  Submit refill requests through [employer] or call your pharmacy and they will forward the refill request to us. Please allow 3 business days for your refill to be completed.          Additional Information About Your Visit        MyChart Information     SGB gives you secure access to your electronic health record. If you see a primary care provider, you can also send messages to your care team and make appointments. If you have questions, please call your primary care clinic.  If you do not have a primary care provider, please call [phone] and they will assist you.        Care  EveryWhere ID     This is your Care EveryWhere ID. This could be used by other organizations to access your [place] medical records  [id number]        Your Vitals Were     Last Period                   01/02/2018            Blood Pressure from Last 3 Encounters:   01/11/18 108/73   01/03/18 97/66   08/02/17 112/72    Weight from Last 3 Encounters:   01/11/18 50.8 kg (112 lb)   01/03/18 51.2 kg (112 lb 12.8 oz)   08/02/17 52.2 kg (115 lb 1.6 oz)              We Performed the Following     NEUROMUSCULAR RE-EDUCATION     THERAPEUTIC ACTIVITIES     THERAPEUTIC EXERCISES        Primary Care Provider Office Phone # Fax #    Md [facility], [phone]       [address] [place] [address]        Equal Access to Services     SHELDON WANG : Hadii aad ku hadasho Sojeannie, waaxda luqadaha, qaybta kaalmada adeegyada, kwasi alejo . So [facility] [phone].    ATENCIÓN: Si habla español, tiene a griffith disposición servicios gratuitos de asistencia lingüística. Llame al [phone].    We comply with applicable federal civil rights laws and [place] laws. We do not discriminate on the basis of race, color, national origin, age, disability, sex, sexual orientation, or gender identity.            Thank you!     [facility] [place]  for your care. Our goal is always to provide you with excellent care. Hearing back from our patients is one way we can continue to improve our services. Please take a few minutes to complete the written survey that you may receive in the mail after your visit with us. Thank you!             Your Updated Medication List - Protect others around you: Learn how to safely use, store and throw away your medicines at www.disposemymeds.org.          This list is accurate as of: 1/10/18 11:59 PM.  Always use your most recent med list.                   Brand Name Dispense Instructions for use Diagnosis     calcium-magnesium 500-250 MG Tabs per tablet    CALMAG     Take 1 tablet by mouth daily        PRENATAL VITAMINS PO           SOLUBLE FIBER/PROBIOTICS PO           VITAMIN D (CHOLECALCIFEROL) PO      Take by mouth daily

## 2018-01-11 ENCOUNTER — OFFICE VISIT (OUTPATIENT)
Dept: UROLOGY | Facility: CLINIC | Age: 43
End: 2018-01-11
Payer: COMMERCIAL

## 2018-01-11 VITALS
HEART RATE: 72 BPM | DIASTOLIC BLOOD PRESSURE: 73 MMHG | HEIGHT: 63 IN | WEIGHT: 112 LBS | BODY MASS INDEX: 19.84 KG/M2 | SYSTOLIC BLOOD PRESSURE: 108 MMHG

## 2018-01-11 DIAGNOSIS — N39.3 FEMALE STRESS INCONTINENCE: Primary | ICD-10-CM

## 2018-01-11 ASSESSMENT — PAIN SCALES - GENERAL: PAINLEVEL: NO PAIN (0)

## 2018-01-11 NOTE — LETTER
"1/11/2018       RE: Dennise Burroughs  [address]   [place] [address]     Dear Colleague,    Thank you for referring your patient, Dennise Burroughs, to the [facility] UROLOGY AND INST FOR PROSTATE AND UROLOGIC CANCERS at [facility]. Please see a copy of my visit note below.    January 11, 2018    Return visit    Patient returns today for follow up for pessary fitting. She denies any changes in her health since last visit.    /73  Pulse 72  Ht 1.6 m (5' 3\")  Wt 50.8 kg (112 lb)  LMP 01/02/2018  BMI 19.84 kg/m2  She is comfortable, in no distress, non-labored breathing.  Abdomen is soft, non-tender, non-distended.  Normal external female genitalia.  Negative CST.  Fit with #2 incontinence ring which she was able to demonstrate self care with use of umbilical tape    A/P: 42 year old F with stress incontinence    Will try pessary    Will return in about a month to see me a WHS, sooner if needed    15 minutes were spent with the patient today, > 50% in counseling and coordination of care    Umberto Chavarria MD MPH   of Urology    CC  Patient Care Team:  [facility] Md Annelise, MD as PCP - General  UMBERTO CHAVARRIA              "

## 2018-01-11 NOTE — PROGRESS NOTES
"January 11, 2018    Return visit    Patient returns today for follow up for pessary fitting. She denies any changes in her health since last visit.    /73  Pulse 72  Ht 1.6 m (5' 3\")  Wt 50.8 kg (112 lb)  LMP 01/02/2018  BMI 19.84 kg/m2  She is comfortable, in no distress, non-labored breathing.  Abdomen is soft, non-tender, non-distended.  Normal external female genitalia.  Negative CST.  Fit with #2 incontinence ring which she was able to demonstrate self care with use of umbilical tape    A/P: 42 year old F with stress incontinence    Will try pessary    Will return in about a month to see me a WHS, sooner if needed    15 minutes were spent with the patient today, > 50% in counseling and coordination of care    Umberto Chavarria MD MPH   of Urology    CC  Patient Care Team:  [facility] Md Annelise, MD as PCP - General  UMBERTO CHAVARRIA SEE GHAZALA              "

## 2018-01-11 NOTE — MR AVS SNAPSHOT
After Visit Summary   1/11/2018    Dennise Burroughs    MRN: [id number]           Patient Information     Date Of Birth          1975        Visit Information        Provider Department      1/11/2018 10:00 AM Frida Chavarria MD [facility] and [facility]        Today's Diagnoses     Female stress incontinence    -  1      Care Instructions    Take pessary out overnight and leave out overnight    Websites with free information:    American Urogynecologic Society patient website: www.voicesforpfd.org    Total Control Program: www.totalcontrolprogram.com    We discussed possible urethral bulking for the stress incontinence    Return to see me Wednesday Feb 7 at [facility]          Follow-ups after your visit        Your next 10 appointments already scheduled     Feb 07, 2018  9:30 AM CST   Return Visit with Frida Chavarria MD   [facility] ([facility])    [facility]  [address] [facility] [address]  [facility] [address] [phone]              Who to contact     Please call your clinic at [phone] to:    Ask questions about your health    Make or cancel appointments    Discuss your medicines    Learn about your test results    Speak to your doctor   If you have compliments or concerns about an experience at your clinic, or if you wish to file a complaint, please contact [facility] Patient Relations at [phone] or email us at [email or web address]         Additional Information About Your Visit        MyChart Information     Take the Interviewt gives you secure access to your electronic health record. If you see a primary care provider, you can also send messages to your care team and make appointments. If you have questions, please call your primary care clinic.  If you do not have a primary care provider, please call [phone] and they will assist you.       "[employer] is an electronic gateway that provides easy, online access to your medical records. With [employer], you can request a clinic appointment, read your test results, renew a prescription or communicate with your care team.     To access your existing account, please contact your [facility] or call [phone] for assistance.        Care EveryWhere ID     This is your Care EveryWhere ID. This could be used by other organizations to access your [place] medical records  [id number]        Your Vitals Were     Pulse Height Last Period BMI (Body Mass Index)          72 1.6 m (5' 3\") 01/02/2018 19.84 kg/m2         Blood Pressure from Last 3 Encounters:   01/11/18 108/73   01/03/18 97/66   08/02/17 112/72    Weight from Last 3 Encounters:   01/11/18 50.8 kg (112 lb)   01/03/18 51.2 kg (112 lb 12.8 oz)   08/02/17 52.2 kg (115 lb 1.6 oz)              We Performed the Following     Fit/Insert Intravaginal Support Device/Pessary (26365)     PESSARY, NON RUBBER,ANY TYPE        Primary Care Provider Office Phone # Fax #    [facility] [phone]       [address] [place] [address]        Equal Access to Services     SHELDON WANG : Hadii edel ku hadasho Soomaali, waaxda luqadaha, qaybta kaalmada adeegyada, kwasi saavedra. So [facility] [phone].    ATENCIÓN: Si habla español, tiene a griffith disposición servicios gratuitos de asistencia lingüística. Llame al [phone].    We comply with applicable federal civil rights laws and [place] laws. We do not discriminate on the basis of race, color, national origin, age, disability, sex, sexual orientation, or gender identity.            Thank you!     Thank you for choosing [facility] AND [facility] FOR PROSTATE AND UROLOGIC CANCERS  for your care. Our goal is always to provide you with excellent care. Hearing back from our patients is one way we can continue to improve our services. Please " take a few minutes to complete the written survey that you may receive in the mail after your visit with us. Thank you!             Your Updated Medication List - Protect others around you: Learn how to safely use, store and throw away your medicines at www.disposemymeds.org.          This list is accurate as of: 1/11/18 11:54 AM.  Always use your most recent med list.                   Brand Name Dispense Instructions for use Diagnosis    calcium-magnesium 500-250 MG Tabs per tablet    CALMAG     Take 1 tablet by mouth daily        PRENATAL VITAMINS PO           SOLUBLE FIBER/PROBIOTICS PO           VITAMIN D (CHOLECALCIFEROL) PO      Take by mouth daily

## 2018-01-11 NOTE — PROGRESS NOTES
[facility]  System  Physical Exam  General   ROS    PROGRESS  REPORT    Progress reporting period is from 8/18/2017 to 1/10/2018.       SUBJECTIVE  Subjective: Patient with numerous questions regarding incontinence ring. She continues to have urgency or leakage intermittenlty with running to serena her daugther, worse in colder in weather. She also will have leakage with bending over to brush her teeth. Pt does feel her strength has improved but discouraged that she needs another intervention to improve symptoms.     Current pain level is : 0/10    Initial Pain level: 0/10.   Changes in function:  Minimal change in function since last session. Great improvement in pelvic floor and core strength  Adverse reaction to treatment or activity: None    OBJECTIVE  Changes noted in objective findings:  Yes, please see note from 12/27/2017 for objective measurements as they were the same this date.   Objective: Strength continues to be improved from initial evaluation. See PN from 12/27/2017 for objective measurements. Reviewed HEP for core and proximal muscle strengthening. Plan to continue with HEP and follow up with MD for incontinence ring. A great amount of time spent discussing activation of core and pelvic muscles at times when pt is currently having incontinence issues. Plan for pt to continue HEP independently at this time.      ASSESSMENT/PLAN  Updated problem list and treatment plan: Diagnosis 1:  Mixed Incontinence  Impaired muscle performance - neuro re-education and home program  STG/LTGs have been met or progress has been made towards goals:  Pt's progress towards goals has plateaued at this time  Assessment of Progress: The patient's progress has plateaued.  Self Management Plans:  Patient has been instructed in a home treatment program.  Dennise continues to require the following intervention to meet STG and LTG's:  PT intervention is no longer required to meet STG/LTG.    Recommendations:  Pt planning to follow up  with MD for incontinence ring. Pt with much improved strength and recommend she continue home exercise program independently at this time.     Please refer to the daily flowsheet for treatment today, total treatment time and time spent performing 1:1 timed codes.

## 2018-02-07 ENCOUNTER — OFFICE VISIT (OUTPATIENT)
Dept: UROLOGY | Facility: CLINIC | Age: 43
End: 2018-02-07
Attending: UROLOGY
Payer: COMMERCIAL

## 2018-02-07 VITALS
WEIGHT: 114.9 LBS | SYSTOLIC BLOOD PRESSURE: 106 MMHG | HEIGHT: 63 IN | BODY MASS INDEX: 20.36 KG/M2 | DIASTOLIC BLOOD PRESSURE: 68 MMHG

## 2018-02-07 DIAGNOSIS — M62.89 PELVIC FLOOR DYSFUNCTION: ICD-10-CM

## 2018-02-07 DIAGNOSIS — R35.0 URINARY FREQUENCY: Primary | ICD-10-CM

## 2018-02-07 DIAGNOSIS — N39.3 FEMALE STRESS INCONTINENCE: ICD-10-CM

## 2018-02-07 PROCEDURE — G0463 HOSPITAL OUTPT CLINIC VISIT: HCPCS | Mod: ZF

## 2018-02-07 ASSESSMENT — PAIN SCALES - GENERAL: PAINLEVEL: NO PAIN (0)

## 2018-02-07 NOTE — LETTER
"2/7/2018       RE: Dennise Burroughs  [address]   [place] [address]     Dear Colleague,    Thank you for referring your patient, Dennise Burroughs, to the [facility] at [facility]. Please see a copy of my visit note below.    February 7, 2018    Return visit    Patient returns today for follow up.  States that she has used the pessary a couple of times and it was fine.  States that since she last saw me she has started working out again and has noted that the urgency is much better.  When asked about if she wants something more she isn't sure.  Also she still has been considering trying for another pregnancy. She denies any changes in her health since last visit.    /68  Ht 1.6 m (5' 3\")  Wt 52.1 kg (114 lb 14.4 oz)  Breastfeeding? Yes  BMI 20.35 kg/m2  She is comfortable, in no distress, non-labored breathing.      A/P: 42 year old F with stress incontinence, urinary urgency, pelvic floor dysfunction    At this time discussed with patient that if she is feeling that her symptoms are better that I would encourage her to continue her current workout regimen and doing her pelvic floor exercises    We discussed that she can use the pessary as needed     At this time given that she is still contemplating another pregnancy I advised that we hold off on any invasive interventions    Will have her return to see us at one year, sooner if needed    10 minutes were spent with the patient today, > 50% in counseling and coordination of care    Frida Chavarria MD MPH   of Urology    CC  Patient Care Team:  Wilbur Neri Md, MD as PCP - General  WILBUR NERI MD        "

## 2018-02-07 NOTE — NURSING NOTE
rajan says symptoms have improved since January.  She was been working out more and the sense of urgency is much better.  Has only needed to use the pessary a few times.

## 2018-02-07 NOTE — PATIENT INSTRUCTIONS
Websites with free information:    American Urogynecologic Society patient website: www.voicesforpfd.org    Total Control Program: www.totalcontrolprogram.com    Please continue your exercises and the pelvic floor therapy    Use the pessary as needed    Return to see us in one year, sooner if needed

## 2018-02-07 NOTE — MR AVS SNAPSHOT
After Visit Summary   2/7/2018    Dennise Burroughs    MRN: [id number]           Patient Information     Date Of Birth          1975        Visit Information        Provider Department      2/7/2018 9:30 AM Frida Chavarria MD [facility]        Today's Diagnoses     Urinary frequency    -  1    Female stress incontinence        Pelvic floor dysfunction          Care Instructions    Websites with free information:    American Urogynecologic Society patient website: www.voicesforpfd.org    Total Control Program: www.totalcontrolprogram.com    Please continue your exercises and the pelvic floor therapy    Use the pessary as needed    Return to see us in one year, sooner if needed          Follow-ups after your visit        Follow-up notes from your care team     Return in about 1 year (around 2/7/2019).      Who to contact     Please call your clinic at [phone] to:    Ask questions about your health    Make or cancel appointments    Discuss your medicines    Learn about your test results    Speak to your doctor   If you have compliments or concerns about an experience at your clinic, or if you wish to file a complaint, please contact [facility] Patient Relations at [phone] or email us at [email or web address]         Additional Information About Your Visit        MyChart Information     [employer] gives you secure access to your electronic health record. If you see a primary care provider, you can also send messages to your care team and make appointments. If you have questions, please call your primary care clinic.  If you do not have a primary care provider, please call [phone] and they will assist you.      [employer] is an electronic gateway that provides easy, online access to your medical records. With [employer], you can request a clinic appointment, read your test results, renew a prescription or communicate with your care  "team.     To access your existing account, please contact your [facility] or call [phone] for assistance.        Care EveryWhere ID     This is your Care EveryWhere ID. This could be used by other organizations to access your [place] medical records  [id number]        Your Vitals Were     Height Breastfeeding? BMI (Body Mass Index)             1.6 m (5' 3\") Yes 20.35 kg/m2          Blood Pressure from Last 3 Encounters:   02/07/18 106/68   01/11/18 108/73   01/03/18 97/66    Weight from Last 3 Encounters:   02/07/18 52.1 kg (114 lb 14.4 oz)   01/11/18 50.8 kg (112 lb)   01/03/18 51.2 kg (112 lb 12.8 oz)              Today, you had the following     No orders found for display       Primary Care Provider Office Phone # Fax #    [facility] [phone]       [address] [place] [address]        Equal Access to Services     SHELDON WANG : Hadii edel ku hadasho Soomaali, waaxda luqadaha, qaybta kaalmada adeegyada, kwasi alejo . So [facility] [phone].    ATENCIÓN: Si habla español, tiene a griffith disposición servicios gratuitos de asistencia lingüística. [facility] [phone].    We comply with applicable federal civil rights laws and [place] laws. We do not discriminate on the basis of race, color, national origin, age, disability, sex, sexual orientation, or gender identity.            Thank you!     Thank you for choosing [facility]  for your care. Our goal is always to provide you with excellent care. Hearing back from our patients is one way we can continue to improve our services. Please take a few minutes to complete the written survey that you may receive in the mail after your visit with us. Thank you!             Your Updated Medication List - Protect others around you: Learn how to safely use, store and throw away your medicines at www.disposemymeds.org.          This list is accurate as of " 2/7/18 10:02 AM.  Always use your most recent med list.                   Brand Name Dispense Instructions for use Diagnosis    calcium-magnesium 500-250 MG Tabs per tablet    CALMAG     Take 1 tablet by mouth daily        PRENATAL VITAMINS PO           SOLUBLE FIBER/PROBIOTICS PO           VITAMIN D (CHOLECALCIFEROL) PO      Take by mouth daily

## 2018-02-07 NOTE — PROGRESS NOTES
"February 7, 2018    Return visit    Patient returns today for follow up.  States that she has used the pessary a couple of times and it was fine.  States that since she last saw me she has started working out again and has noted that the urgency is much better.  When asked about if she wants something more she isn't sure.  Also she still has been considering trying for another pregnancy. She denies any changes in her health since last visit.    /68  Ht 1.6 m (5' 3\")  Wt 52.1 kg (114 lb 14.4 oz)  Breastfeeding? Yes  BMI 20.35 kg/m2  She is comfortable, in no distress, non-labored breathing.      A/P: 42 year old F with stress incontinence, urinary urgency, pelvic floor dysfunction    At this time discussed with patient that if she is feeling that her symptoms are better that I would encourage her to continue her current workout regimen and doing her pelvic floor exercises    We discussed that she can use the pessary as needed     At this time given that she is still contemplating another pregnancy I advised that we hold off on any invasive interventions    Will have her return to see us at one year, sooner if needed    10 minutes were spent with the patient today, > 50% in counseling and coordination of care    Frida Chavarria MD MPH   of Urology    CC  Patient Care Team:  Wilbur Neri Md, MD as PCP - General  WILBUR NERI MD              "

## 2019-01-16 ENCOUNTER — OFFICE VISIT (OUTPATIENT)
Dept: OBGYN | Facility: CLINIC | Age: 44
End: 2019-01-16
Attending: NURSE PRACTITIONER
Payer: COMMERCIAL

## 2019-01-16 ENCOUNTER — ANCILLARY PROCEDURE (OUTPATIENT)
Dept: MAMMOGRAPHY | Facility: CLINIC | Age: 44
End: 2019-01-16
Attending: NURSE PRACTITIONER
Payer: COMMERCIAL

## 2019-01-16 VITALS
DIASTOLIC BLOOD PRESSURE: 74 MMHG | HEART RATE: 91 BPM | BODY MASS INDEX: 21.92 KG/M2 | HEIGHT: 63 IN | SYSTOLIC BLOOD PRESSURE: 105 MMHG | WEIGHT: 123.7 LBS

## 2019-01-16 DIAGNOSIS — Z12.31 VISIT FOR SCREENING MAMMOGRAM: ICD-10-CM

## 2019-01-16 DIAGNOSIS — Z13.220 SCREENING FOR LIPOID DISORDERS: ICD-10-CM

## 2019-01-16 DIAGNOSIS — Z00.00 VISIT FOR PREVENTIVE HEALTH EXAMINATION: Primary | ICD-10-CM

## 2019-01-16 LAB
CHOLEST SERPL-MCNC: 208 MG/DL
HDLC SERPL-MCNC: 53 MG/DL
LDLC SERPL CALC-MCNC: 126 MG/DL
NONHDLC SERPL-MCNC: 155 MG/DL
TRIGL SERPL-MCNC: 146 MG/DL

## 2019-01-16 PROCEDURE — G0463 HOSPITAL OUTPT CLINIC VISIT: HCPCS | Mod: ZF

## 2019-01-16 PROCEDURE — 36415 COLL VENOUS BLD VENIPUNCTURE: CPT | Performed by: NURSE PRACTITIONER

## 2019-01-16 PROCEDURE — 77067 SCR MAMMO BI INCL CAD: CPT

## 2019-01-16 PROCEDURE — 80061 LIPID PANEL: CPT | Performed by: NURSE PRACTITIONER

## 2019-01-16 SDOH — HEALTH STABILITY: MENTAL HEALTH: HOW OFTEN DO YOU HAVE A DRINK CONTAINING ALCOHOL?: NEVER

## 2019-01-16 ASSESSMENT — ANXIETY QUESTIONNAIRES
GAD7 TOTAL SCORE: 1
2. NOT BEING ABLE TO STOP OR CONTROL WORRYING: NOT AT ALL
5. BEING SO RESTLESS THAT IT IS HARD TO SIT STILL: SEVERAL DAYS
1. FEELING NERVOUS, ANXIOUS, OR ON EDGE: NOT AT ALL
3. WORRYING TOO MUCH ABOUT DIFFERENT THINGS: NOT AT ALL
6. BECOMING EASILY ANNOYED OR IRRITABLE: NOT AT ALL
7. FEELING AFRAID AS IF SOMETHING AWFUL MIGHT HAPPEN: NOT AT ALL

## 2019-01-16 ASSESSMENT — MIFFLIN-ST. JEOR: SCORE: 1180.23

## 2019-01-16 ASSESSMENT — PATIENT HEALTH QUESTIONNAIRE - PHQ9
5. POOR APPETITE OR OVEREATING: NOT AT ALL
SUM OF ALL RESPONSES TO PHQ QUESTIONS 1-9: 1

## 2019-01-16 NOTE — PATIENT INSTRUCTIONS
Go to the lab to have your preventative health labs done  Next pap smear: 4/26/2021  Mammogram to be done today and then yearly after that.  Continue care with Dr. Chavarria as needed for incontinence.   Encouraged exercise  Recommend 1200mg calcium per day    PREVENTIVE HEALTH RECOMMENDATIONS:   Most women need a yearly breast and pelvic exam.    A PAP screen, a test done DURING a pelvic exam, is NO longer recommended yearly.    March 2013, screening guidelines recommended by ACOG for PAP screen are:    1) First pap at age 21.    2) Pap every 3 years until age 30.    3) After age 30, pap every 3 years or Pap with HR HPV screen every 5 years until age 65.  4) Women do NOT need a vaginal Pap screen after a hysterectomy (surgical removal of the uterus) when they have not had cancer.    Exceptions:  1) Yearly pap if HIV+ or immunosuppressed secondary to organ transplant  2) CUATE II-III continue routine screening for 20 years.    I encourage you continue looking for opportunities to choose a healthy lifestyle:       * Choose to eat a heart healthy diet. Check out the FOOD PLATE guidelines at: http://www.chooseTelinetplate.gov/ for helpful hints on weight and cholesterol management.  Balance your caloric intake with exercise to maintain a BMI in the 22 to 26 range. For bone health: Eat calcium-rich foods like yogurt, broccoli or take chewable calcium pills (500 to 600 mg) twice a day with food.       * Exercise for at least an average of 30 minutes a day, 5 days of the week. This will help you control your weight, release stress, and help prevent disease.      * Take a Vitamin D3 supplement daily fall through spring and during summer unless you ipex47-67' full body sun exposure to skin without sunscreen.      * DO wear sunscreen to prevent skin cancer after the first 15-30 minutes.      * Identify stressors in your life, find ways to release the stress, and, make time for yourself. PLEASE ask for help if mood changes last longer  than two weeks.     * Limit alcohol to one drink per day.  No smoking.  Avoid second hand smoke. If you smoke, ask for help to stop.       *  If you are in a sexual relationship, talk with your partner about possible infection risks and take action to protect yourself from exposure to a sexual infection.    Please request an infection screen for STIs (sexually transmitted infections) if you are less than age 26 OR believe that you may be at risk.     Get a flu shot each year. Get a tetanus shot every 10 years. EVERYONE needs a pertussis (Whooping cough) booster.    See your dentist twice a year for an exam and preventive care cleaning.     Consider the following screen tests:    1) cholesterol test every 5 years.     2) yearly mammogram after age 40 unless you have identified risks.    3) colonoscopy every 10 years after age 50 unless you have identified risks.    4) diabetes blood test screening if you are at risk for diabetes.      Additional information that you may also find helpful:  The Internet now gives us access to LOTS of information -- some of it helpful, research documented and also plenty of harmful, anecdotal information that may not pertain to your situtaion. Consider visiting the following websites for accurate health information:    www.vitamindcouncil.org/ : Info and ongoing research re Vitamin D    www.fairview.org : Up to date and easily searchable information on multiple topics.    www.medlineplus.gov : medication info, interactive tutorials, watch real surgeries online    www.cdc.gov : public health info, travel advisories, epidemics (H1N1)    www.eveline/std.org: current research re diagnosis, treatment and prevention of sexually contacted infections.    www.health.[facility].[place] : [place] dept of heatlh, public health issues in [place], N1N1    www.familydoctor.org : good info from the Academy of Family Physicians

## 2019-01-16 NOTE — PROGRESS NOTES
"  Progress Note    SUBJECTIVE:  Dennise Burroughs is an 43 year old, , who requests an Annual Preventive Exam.     Concerns today include: none    Contraception: none, rarely sexually active. Reports her  has low libido due to the anti-depressant medications he is taking. She sleeps with her almost 3 yr old daughter, which is part of the [place] culture. Reports her and her  have a strong relationship and she is not overly bothered by not being sexually active. She has become used to that not being a part of their relationship. Not planning future pregnancies. Not interested in discussing contraceptive options to prevent pregnancy in the few times she is sexually active.    Dennise has been receiving care from Dr. Chavarria for stress incontinence, urinary urgency, and pelvic floor dysfunction. She has a pessary to wear as needed; she reports today she does not wear it often as it is \"inconvenieint.\" She also has completed PT. Dennise states that she does not experience incontinence as often and it is not overly bothersome. She plans to follow-up with Dr. Chavarria PRKASEY.     Menstrual History:  Menstrual History 1/3/2018 2019 2019   LAST MENSTRUAL PERIOD 2018 -   Menarche Age - - 11   Period Cycle (Days) - - 28   Period Duration (Days) - - 5   Method of Contraception - - None   Period Pattern - - Regular   Menstrual Flow - - Moderate   Menstrual Control - - Maxi pad   Dysmenorrhea - - Mild   PMS Symptoms - - Cramping   Reviewed Today - - Yes     Finished breastfeeding Summer 2018.     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 Dennise's mother-in-law lives near them and has early Dimentia; this causes worry for Dennise, as her mother-in-law is very forgetful. Otherwise feels that her mental health is stable. Feels safe.     SUJEY-7 SCORE 2017   Total Score 0 (minimal anxiety) -   Total Score 3 1     PHQ-9 SCORE 2017   PHQ-9 Total Score MyChart 1 (Minimal depression) -   PHQ-9 Total Score 1 1       HISTORY:    No current outpatient medications on file prior to visit.  No current facility-administered medications on file prior to visit.   Allergies   Allergen Reactions     Dicloxacillin Hives     Immunization History   Administered Date(s) Administered     Influenza Vaccine IM 3yrs+ 4 Valent IIV4 2016, 2017     Influenza Vaccine, 3 YRS +, IM (QUADRIVALENT W/PRESERVATIVES) 10/07/2015     TDAP Vaccine (Boostrix) 2016     Has received flu vacine in 2018.   Obstetric History       T1      L1     SAB0   TAB0   Ectopic0   Multiple0   Live Births1      Past Medical History:   Diagnosis Date     History of IBS     using probiotics     Hx of previous reproductive problem      Pyelonephritis     hospitalized mid 20's     Past Surgical History:   Procedure Laterality Date      SECTION N/A 3/15/2016    Procedure:  SECTION;  Surgeon: Maria R Andrade MD;  Location: [facility]     EYE SURGERY      [facility]     Family History   Problem Relation Age of Onset     Hypertension Father         oral      Diabetes Father         oral meds?      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 [employer]    Tobacco Use     Smoking status: Never Smoker     Smokeless tobacco: Never Used   Substance and Sexual Activity     Alcohol use: No     Alcohol/week: 0.0 oz     Frequency: Never     Drug use: No     Sexual activity: Not Currently     Partners: Male     Birth control/protection: None   Other Topics Concern      Service Not Asked     Blood Transfusions Not Asked     Caffeine Concern No     Occupational Exposure Not Asked     Hobby Hazards Not Asked     Sleep Concern Not Asked     Stress Concern Not Asked     Weight Concern Not Asked     Special Diet Not Asked     Back Care Not Asked     Exercise Yes     Comment: walking 30 minutes daily      Bike Helmet Not Asked     Seat Belt Not Asked     Self-Exams Not Asked     Parent/sibling w/ CABG, MI or angioplasty before 65F 55M? No   Social History Narrative    How much exercise per week? none    How much calcium per day? With food       How much caffeine per day? 1 cup    How much vitamin D per day? none    Do you/your family wear seatbelts?  Yes    Do you/your family use safety helmets? Yes    Do you/your family use sunscreen? Yes    Do you/your family keep firearms in the home? no    Do you/your family have a smoke detector(s)? Yes        Do you feel safe in your home? Yes    Has anyone ever touched you in an unwanted manner? No     Estrella Escalante 1/16/19         ROS  ROS: 10 point ROS neg other than the symptoms noted above in the HPI.    EXAM:  Blood pressure 105/74, pulse 91, height 1.6 m (5' 3\"), weight 56.1 kg (123 lb 11.2 oz), last menstrual period 12/31/2018, not currently breastfeeding. Body mass index is 21.91 kg/m .  General - pleasant female in no acute distress.  Skin - no suspicious " lesions or rashes  EENT-  euthyroid with out palpable nodules  Neck - supple without lymphadenopathy.  Lungs - clear to auscultation bilaterally.  Heart - regular rate and rhythm without murmur.  Abdomen - soft, nontender, nondistended, no masses or organomegaly noted.  Musculoskeletal - no gross deformities.  Neurological - normal strength, sensation, and mental status.    Breast Exam:  Breast: Without visible skin changes. No dimpling or lesions seen.   Breasts supple, non-tender with palpation, no dominant mass, nodularity, or nipple discharge noted bilaterally. Axillary nodes negative.      Pelvic Exam: deferred      ASSESSMENT:  Encounter Diagnoses   Name Primary?     Visit for preventive health examination Yes     Screening for lipoid disorders         PLAN:   Orders Placed This Encounter   Procedures     Lipid panel reflex to direct LDL Fasting     Next pap smear due 5/2021  Counseled patient on recommendation for colon cancer screening (colonoscopy) to start at age 45 (in 1 yr).  She was open to this; plan to place order at next annual exam.  First mammogram completed today  Up to date on vaccines  Lipid screening today  Counseled patient that, although low likelihood of conception given that she required IVF to become pregnant, and given her age, it is possible that any intercourse could result in a pregnancy. Encouraged condom use for prevention of pregnancy when she and her  are sexually active as she is not desiring another pregnancy; may return to clinic at anytime to discuss alternative options if interested (declined conversation today).    Additional teaching done at this visit regarding calcium (1200 mg per day), self breast exam, exercise, mental health and weight/diet.    Return to clinic in one year.  Follow-up as needed.    Mindi Speulveda, DNP, APRN, WHNP

## 2019-01-16 NOTE — LETTER
"2019       RE: Dennise Burroughs  [address]  [place] [address]     Dear Colleague,    Thank you for referring your patient, Dennise Burroughs, to the [facility] at [facility]. Please see a copy of my visit note below.      Progress Note    SUBJECTIVE:  Dennise Burroughs is an 43 year old, , who requests an Annual Preventive Exam.     Concerns today include: none    Contraception: none, rarely sexually active. Reports her  has low libido due to the anti-depressant medications he is taking. She sleeps with her almost 3 yr old daughter, which is part of the [place] culture. Reports her and her  have a strong relationship and she is not overly bothered by not being sexually active. She has become used to that not being a part of their relationship. Not planning future pregnancies. Not interested in discussing contraceptive options to prevent pregnancy in the few times she is sexually active.    Dennise has been receiving care from Dr. Chavarria for stress incontinence, urinary urgency, and pelvic floor dysfunction. She has a pessary to wear as needed; she reports today she does not wear it often as it is \"inconvenieint.\" She also has completed PT. Dennise states that she does not experience incontinence as often and it is not overly bothersome. She plans to follow-up with Dr. Aura CRUZ.     Menstrual History:  Menstrual History 1/3/2018 2019 2019   LAST MENSTRUAL PERIOD 2018 -   Menarche Age - - 11   Period Cycle (Days) - - 28   Period Duration (Days) - - 5   Method of Contraception - - None   Period Pattern - - Regular   Menstrual Flow - - Moderate   Menstrual Control - - Maxi pad   Dysmenorrhea - - Mild   PMS Symptoms - - Cramping   Reviewed Today - - Yes     Finished breastfeeding Summer 2018.     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 Dennise's mother-in-law lives near them and has early Dimentia; this causes worry for Dennise, as her mother-in-law is very forgetful. Otherwise feels that her mental health is stable. Feels safe.     SUJEY-7 SCORE 2017   Total Score 0 (minimal anxiety) -   Total Score 3 1     PHQ-9 SCORE 2017   PHQ-9 Total Score MyChart 1 (Minimal depression) -   PHQ-9 Total Score 1 1       HISTORY:    No current outpatient medications on file prior to visit.  No current facility-administered medications on file prior to visit.   Allergies   Allergen Reactions     Dicloxacillin Hives     Immunization History   Administered Date(s) Administered     Influenza Vaccine IM 3yrs+ 4 Valent IIV4 2016, 2017     Influenza Vaccine, 3 YRS +, IM (QUADRIVALENT W/PRESERVATIVES) 10/07/2015     TDAP Vaccine (Boostrix) 2016     Has received flu vacine in 2018.   Obstetric History       T1      L1     SAB0   TAB0   Ectopic0   Multiple0   Live Births1      Past Medical History:   Diagnosis Date     History of IBS     using probiotics     Hx of previous reproductive problem      Pyelonephritis     hospitalized mid 20's     Past Surgical History:   Procedure Laterality Date      SECTION N/A 3/15/2016    Procedure:  SECTION;  Surgeon: Maria R Andrade MD;  Location:  L+D     EYE SURGERY      [facility]     Family History   Problem Relation Age of Onset      Hypertension Father         oral      Diabetes Father         oral meds?      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[employer] [employer]    Tobacco Use     Smoking status: Never Smoker     Smokeless tobacco: Never Used   Substance and Sexual Activity     Alcohol use: No     Alcohol/week: 0.0 oz     Frequency: Never     Drug use: No     Sexual activity: Not Currently     Partners: Male     Birth control/protection: None   Other Topics Concern      Service Not Asked     Blood Transfusions Not Asked     Caffeine Concern No     Occupational Exposure Not Asked     Hobby Hazards Not Asked     Sleep Concern Not Asked     Stress Concern Not Asked     Weight Concern Not Asked     Special Diet Not Asked     Back Care Not Asked     Exercise Yes     Comment: walking 30 minutes daily      Bike Helmet Not Asked     Seat Belt Not Asked     Self-Exams Not Asked     Parent/sibling w/ CABG, MI or angioplasty before 65F 55M? No   Social History Narrative    How much exercise per week? none    How much calcium per day? With food       How much caffeine per day? 1 cup    How much vitamin D per day? none    Do you/your family wear seatbelts?  Yes    Do you/your family use safety helmets? Yes    Do you/your family use sunscreen? Yes    Do you/your family keep firearms in the home? no    Do you/your family have a smoke detector(s)? Yes        Do you feel safe in your home? Yes    Has anyone ever touched you in an unwanted manner? No     Estrella Escalante 1/16/19         EXAM:  Blood pressure 105/74, pulse 91, height  "1.6 m (5' 3\"), weight 56.1 kg (123 lb 11.2 oz), last menstrual period 12/31/2018, not currently breastfeeding. Body mass index is 21.91 kg/m .  General - pleasant female in no acute distress.  Skin - no suspicious lesions or rashes  EENT-  euthyroid with out palpable nodules  Neck - supple without lymphadenopathy.  Lungs - clear to auscultation bilaterally.  Heart - regular rate and rhythm without murmur.  Abdomen - soft, nontender, nondistended, no masses or organomegaly noted.  Musculoskeletal - no gross deformities.  Neurological - normal strength, sensation, and mental status.    Breast Exam:  Breast: Without visible skin changes. No dimpling or lesions seen.   Breasts supple, non-tender with palpation, no dominant mass, nodularity, or nipple discharge noted bilaterally. Axillary nodes negative.      Pelvic Exam: deferred      ASSESSMENT:  Encounter Diagnoses   Name Primary?     Visit for preventive health examination Yes     Screening for lipoid disorders         PLAN:   Orders Placed This Encounter   Procedures     Lipid panel reflex to direct LDL Fasting     Next pap smear due 5/2021  Counseled patient on recommendation for colon cancer screening (colonoscopy) to start at age 45 (in 1 yr).  She was open to this; plan to place order at next annual exam.  First mammogram completed today  Up to date on vaccines  Lipid screening today  Counseled patient that, although low likelihood of conception given that she required IVF to become pregnant, and given her age, it is possible that any intercourse could result in a pregnancy. Encouraged condom use for prevention of pregnancy when she and her  are sexually active as she is not desiring another pregnancy; may return to clinic at anytime to discuss alternative options if interested (declined conversation today).    Additional teaching done at this visit regarding calcium (1200 mg per day), self breast exam, exercise, mental health and weight/diet.    Return " to clinic in one year.  Follow-up as needed.    Mindi Sepulveda, MINESH, APRN, WHNP

## 2019-01-17 ASSESSMENT — ANXIETY QUESTIONNAIRES: GAD7 TOTAL SCORE: 1

## 2019-07-27 NOTE — PATIENT INSTRUCTIONS
Take pessary out overnight and leave out overnight    Websites with free information:    American [employer]ro[employer] patient website: www.voicesforpfd.org    Total Control Program: www.totalcontrolprogram.com    We discussed possible urethral bulking for the stress incontinence    Return to see me Wednesday Feb 7 at [facility]   medications

## 2020-01-17 ASSESSMENT — ANXIETY QUESTIONNAIRES
7. FEELING AFRAID AS IF SOMETHING AWFUL MIGHT HAPPEN: NOT AT ALL
GAD7 TOTAL SCORE: 0
4. TROUBLE RELAXING: NOT AT ALL
3. WORRYING TOO MUCH ABOUT DIFFERENT THINGS: NOT AT ALL
1. FEELING NERVOUS, ANXIOUS, OR ON EDGE: NOT AT ALL
7. FEELING AFRAID AS IF SOMETHING AWFUL MIGHT HAPPEN: NOT AT ALL
2. NOT BEING ABLE TO STOP OR CONTROL WORRYING: NOT AT ALL
GAD7 TOTAL SCORE: 0
6. BECOMING EASILY ANNOYED OR IRRITABLE: NOT AT ALL
5. BEING SO RESTLESS THAT IT IS HARD TO SIT STILL: NOT AT ALL

## 2020-01-17 ASSESSMENT — ENCOUNTER SYMPTOMS
FLANK PAIN: 0
DIFFICULTY URINATING: 0
HEMATURIA: 0
DYSURIA: 0

## 2020-01-18 ASSESSMENT — ANXIETY QUESTIONNAIRES: GAD7 TOTAL SCORE: 0

## 2020-01-20 ENCOUNTER — ANCILLARY PROCEDURE (OUTPATIENT)
Dept: MAMMOGRAPHY | Facility: CLINIC | Age: 45
End: 2020-01-20
Attending: OBSTETRICS & GYNECOLOGY
Payer: COMMERCIAL

## 2020-01-20 ENCOUNTER — OFFICE VISIT (OUTPATIENT)
Dept: OBGYN | Facility: CLINIC | Age: 45
End: 2020-01-20
Attending: OBSTETRICS & GYNECOLOGY
Payer: COMMERCIAL

## 2020-01-20 VITALS
BODY MASS INDEX: 21.97 KG/M2 | HEIGHT: 63 IN | WEIGHT: 124 LBS | DIASTOLIC BLOOD PRESSURE: 75 MMHG | HEART RATE: 73 BPM | SYSTOLIC BLOOD PRESSURE: 108 MMHG

## 2020-01-20 DIAGNOSIS — Z13.29 SCREENING FOR THYROID DISORDER: ICD-10-CM

## 2020-01-20 DIAGNOSIS — Z13.220 LIPID SCREENING: ICD-10-CM

## 2020-01-20 DIAGNOSIS — Z12.31 VISIT FOR SCREENING MAMMOGRAM: ICD-10-CM

## 2020-01-20 DIAGNOSIS — Z00.00 ROUTINE HEALTH MAINTENANCE: Primary | ICD-10-CM

## 2020-01-20 DIAGNOSIS — Z13.1 SCREENING FOR DIABETES MELLITUS: ICD-10-CM

## 2020-01-20 DIAGNOSIS — N91.4 SECONDARY OLIGOMENORRHEA: ICD-10-CM

## 2020-01-20 DIAGNOSIS — Z12.11 SPECIAL SCREENING FOR MALIGNANT NEOPLASMS, COLON: ICD-10-CM

## 2020-01-20 PROCEDURE — 77067 SCR MAMMO BI INCL CAD: CPT

## 2020-01-20 ASSESSMENT — PAIN SCALES - GENERAL: PAINLEVEL: NO PAIN (0)

## 2020-01-20 ASSESSMENT — MIFFLIN-ST. JEOR: SCORE: 1176.59

## 2020-01-20 NOTE — PROGRESS NOTES
[facility]  Annual Exam    HPI:    Dennise Burroughs is a 44 year old , here for annual exam.      Specific concerns today: none    GYN History  - LMP: Patient's last menstrual period was 2019.  - Menses: Menarche at 12, reports lactational amenorrhea for 2.5 years after birth of her daughter 3/15/16, then reports periods every 2-3 months, lasting 4-5 days, with mild flow (1-2 pads daily). Reports some vaginal dryness, denies hot flashes, night sweats, mood changes, insomnia.   - Pap Smears: Last pap 2016 NILM, HPV neg; no history of abnormal paps, no excisional procedures      Lab Results   Component Value Date    PAP NIL 2016     - Contraception: None  - Sexual Activity: Not currently, reports last sexual activity 2 years ago  - Sexual Concerns: reports vaginal dryness, no sexual dysfunction, no dyspareunia, no low libido, no difficulty reaching orgasm  - Hx STIs: None  - Hx UTIs: hx of pyelonephritis on chart review, no hx of recurrent UTIs  - Hx breast disease: None  - Urinary incontinence/urgency/pelvic floor dysfunction: Reports stress urinary incontinence. Wears a pad currently when she has a cold. She has seen Dr. Chavarria and tried to use a pessary.  Has tried PFPT in the past without significant success. Declines surgical intervention now.   - Fecal incontinence: No  - Hx GYN surgeries: PLTCS    OBHx  OB History    Para Term  AB Living   1 1 1 0 0 1   SAB TAB Ectopic Multiple Live Births   0 0 0 0 1      # Outcome Date GA Lbr Manpreet/2nd Weight Sex Delivery Anes PTL Lv   1 Term 03/15/16 40w4d  3.719 kg (8 lb 3.2 oz) F CS-LTranv Spinal  CHAPIN      Apgar1: 9  Apgar5: 9       Preventative Health  Seat belt usage: Yes  Diet: Balanced, healthy diet   Exercise: Not currently exercising   Feelings of depression: Some feelings of depression due to her 's depression, but generally feeling mood is okay.   Current or historical sexual, physical or mental abuse:  sometimes angry  and unpredictable. Denies overt abuse. States that this has improved slightly. She reports that she has friends she can go to if she needs to do so. Working on talking more.     Health Maintenance  Last mammogram: completed today      PMHx:  Denies personal history of CVA, VTE, CAD, HTN, liver disease, breast cancer. No history of asthma or diabetes.   Past Medical History:   Diagnosis Date     History of IBS     using probiotics     Hx of previous reproductive problem      Pyelonephritis     hospitalized mid 20's     PSHx: PLTCS, Lasik  Past Surgical History:   Procedure Laterality Date      SECTION N/A 3/15/2016    Procedure:  SECTION;  Surgeon: Maria R Andrade MD;  Location:  L+D     EYE SURGERY      Lasik     Meds: Not taking any medications.   No current outpatient medications on file.     Allergies: Dicloxacillin  Allergies   Allergen Reactions     Dicloxacillin Hives     SocHx:   Lives: Lives in the [facility]. Lives with  and daughter.   Works: [employer] Teacher   EtOH: None  Tobacco: Never Smoker  Drugs: Denies illicit drug use.   Abuse:  See above     FamHx:    Family History   Problem Relation Age of Onset     Hypertension Father      Diabetes Father    Denies family history of breast, uterine, ovarian, or colon cancer.     ROS:Answers for HPI/ROS submitted by the patient on 2020   SUJEY 7 TOTAL SCORE: 0  General Symptoms: No  Skin Symptoms: No  HENT Symptoms: No  EYE SYMPTOMS: No  HEART SYMPTOMS: No  LUNG SYMPTOMS: No  INTESTINAL SYMPTOMS: No  URINARY SYMPTOMS: Yes  GYNECOLOGIC SYMPTOMS: No  BREAST SYMPTOMS: No  SKELETAL SYMPTOMS: No  BLOOD SYMPTOMS: No  NERVOUS SYSTEM SYMPTOMS: No  MENTAL HEALTH SYMPTOMS: No  Trouble holding urine or incontinence: Yes  Pain or burning: No  Trouble starting or stopping: No  Increased frequency of urination: No  Blood in urine: No  Decreased frequency of urination: No  Frequent nighttime urination: No  Flank pain: No  Difficulty emptying  "bladder: No       Physical Exam  /75   Pulse 73   Ht 1.6 m (5' 3\")   Wt 56.2 kg (124 lb)   LMP 2019   BMI 21.97 kg/m      General:  Alert, no distress   HEENT:  Normocephalic, without obvious abnormality  No thyromegaly, no thyroid nodules palpated    Breasts: Symmetric, no nipple discharge or retraction, no axillary lymphadenopathy, no palpable nodules or masses bilaterally   Pulmonary:  Clear to auscultation bilaterally, non-labored breathing on room air   Cardiovascular:  Regular rate and rhythm, no murmur   Abdomen:  Soft, non-tender, non-distended   Pelvic: Vulva: No external lesions, normal hair distribution, normal architecture  Vagina: Moist, pink, no abnormal discharge, well rugated, no lesions  Cervix: smooth, pink, no visible lesions  Uterus: Normal size, anteverted, non-tender, mobile  Adnexa: Non-tender, no masses     Extremities:  normal     --Ideal BMI: 18.5-24.9  Current BMI: Body mass index is 21.97 kg/m .  --Underweight = <18.5  --Normal weight = 18.5-24.9  --Overweight = 25-29.9  --Obesity = >30    Assessment/Plan  Dennise Burroughs is a 44 year old  female here for annual exam.     Oligomenorrhea  May represent jennyfer-menopausal transition at slightly younger than average age. Patient plans to discuss with age of onset of menopause with female family members. Will continue to monitor symptoms. Reports vaginal dryness but denies any other menopausal symptoms. Vagina not atrophic, well rugated and estrogenized appearance on exam. No hirsutism, acne, obesity to suggest PCOS.    - ordered TSH     Stress Urinary incontinence   Not currently overly bothersome to patient. S/p consult with Dr. Chavarria and PFPT without significant improvement. Has used pessary in the past. Will return to clinic if surgical management is desired in the future- not interested at this time.     Routine Health Maintenance   1.  Screening   Cervical cancer - pap up to date, plan repeat co-testing 2021   CBE " wnl, Mammography completed today- continue annual screening mammography    Colorectal cancer - begin screening at age 45 per ACS guidelines, paternal family history of colonic polyps. GI referral for colonoscopy provided.    Labs: glucose, lipid panel ordered - patient will return when fasting for 8 hours prior     2.  Immunizations   Tdap up to date, next due 01/11/2026   Influenza yearly    I have seen and examined the patient with the resident. I have reviewed, edited, and agree with the note.   My findings are:as above  Cindy Reyes M.D.
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
## 2020-01-27 DIAGNOSIS — Z13.29 SCREENING FOR THYROID DISORDER: ICD-10-CM

## 2020-01-27 DIAGNOSIS — Z00.00 ROUTINE HEALTH MAINTENANCE: ICD-10-CM

## 2020-01-27 DIAGNOSIS — Z13.1 SCREENING FOR DIABETES MELLITUS: ICD-10-CM

## 2020-01-27 DIAGNOSIS — Z13.220 LIPID SCREENING: ICD-10-CM

## 2020-01-27 LAB
CHOLEST SERPL-MCNC: 218 MG/DL
GLUCOSE SERPL-MCNC: 96 MG/DL (ref 70–99)
HDLC SERPL-MCNC: 53 MG/DL
LDLC SERPL CALC-MCNC: 143 MG/DL
NONHDLC SERPL-MCNC: 165 MG/DL
T4 FREE SERPL-MCNC: 0.89 NG/DL (ref 0.76–1.46)
TRIGL SERPL-MCNC: 108 MG/DL
TSH SERPL DL<=0.005 MIU/L-ACNC: 6.14 MU/L (ref 0.4–4)

## 2020-01-27 PROCEDURE — 84443 ASSAY THYROID STIM HORMONE: CPT | Performed by: OBSTETRICS & GYNECOLOGY

## 2020-01-27 PROCEDURE — 36415 COLL VENOUS BLD VENIPUNCTURE: CPT | Performed by: OBSTETRICS & GYNECOLOGY

## 2020-01-27 PROCEDURE — 84439 ASSAY OF FREE THYROXINE: CPT | Performed by: OBSTETRICS & GYNECOLOGY

## 2020-01-27 PROCEDURE — 80061 LIPID PANEL: CPT | Performed by: OBSTETRICS & GYNECOLOGY

## 2020-01-27 PROCEDURE — 82947 ASSAY GLUCOSE BLOOD QUANT: CPT | Performed by: OBSTETRICS & GYNECOLOGY

## 2020-03-02 ENCOUNTER — HEALTH MAINTENANCE LETTER (OUTPATIENT)
Age: 45
End: 2020-03-02

## 2020-09-14 ENCOUNTER — IMMUNIZATION (OUTPATIENT)
Dept: NURSING | Facility: CLINIC | Age: 45
End: 2020-09-14
Payer: COMMERCIAL

## 2020-09-14 PROCEDURE — 90471 IMMUNIZATION ADMIN: CPT

## 2020-09-14 PROCEDURE — 90686 IIV4 VACC NO PRSV 0.5 ML IM: CPT

## 2020-10-13 ENCOUNTER — TRANSFERRED RECORDS (OUTPATIENT)
Dept: HEALTH INFORMATION MANAGEMENT | Facility: CLINIC | Age: 45
End: 2020-10-13

## 2020-10-16 ENCOUNTER — ANCILLARY PROCEDURE (OUTPATIENT)
Dept: ULTRASOUND IMAGING | Facility: CLINIC | Age: 45
End: 2020-10-16
Attending: FAMILY MEDICINE
Payer: COMMERCIAL

## 2020-10-16 DIAGNOSIS — N93.9 ABNORMAL UTERINE BLEEDING: ICD-10-CM

## 2020-10-16 PROCEDURE — 76830 TRANSVAGINAL US NON-OB: CPT | Mod: GC | Performed by: RADIOLOGY

## 2020-10-16 PROCEDURE — 76856 US EXAM PELVIC COMPLETE: CPT | Mod: GC | Performed by: RADIOLOGY

## 2020-10-19 ENCOUNTER — MEDICAL CORRESPONDENCE (OUTPATIENT)
Dept: HEALTH INFORMATION MANAGEMENT | Facility: CLINIC | Age: 45
End: 2020-10-19

## 2020-10-21 NOTE — PROGRESS NOTES
Progress Note    SUBJECTIVE:  Dennise Burroughs is an 45 year old, , who is here for postmenopausal bleeding. She has not had a period in over one year. On 10/10 she experienced vaginal bleeding that lasted through 10/11. Bleeding was bright red with occasional small clots. She also experienced associated cramping. Cramping improved with a hot pad. She had intercourse approximately 2 weeks prior to bleeding onset.  She denies fever/chills, CP, SOB, or recurrent vaginal bleeding.       Menstrual History:  LMP > 1 year ago  Menarche age 12  Reports irregular periods throughout adult life.   1 pregnancy with IVF performed in [place]  PLTCS      Last    Lab Results   Component Value Date    PAP NIL 2016     History of abnormal Pap smear: NO - age 30- 65 PAP every 3 years recommended    Last   10/13/20 PAP NILM, HPV Neg  Lab Results   Component Value Date    HPV16 Negative 2016     Last   Lab Results   Component Value Date    HPV18 Negative 2016     Last   Lab Results   Component Value Date    HRHPV Negative 2016       HISTORY:    Allergies   Allergen Reactions     Dicloxacillin Hives     Immunization History   Administered Date(s) Administered     Influenza Vaccine IM > 6 months Valent IIV4 2016, 2017, 2020     Influenza Vaccine, 6+MO IM (QUADRIVALENT W/PRESERVATIVES) 10/07/2015     TDAP Vaccine (Boostrix) 2016       OB History    Para Term  AB Living   1 1 1 0 0 1   SAB TAB Ectopic Multiple Live Births   0 0 0 0 1     Past Medical History:   Diagnosis Date     History of IBS     using probiotics     Hx of previous reproductive problem      Pyelonephritis     hospitalized mid 's     Past Surgical History:   Procedure Laterality Date      SECTION N/A 3/15/2016    Procedure:  SECTION;  Surgeon: Maria R Andrade MD;  Location:  L+D     EYE SURGERY      Lasik     Family History   Problem Relation Age of Onset     Hypertension Father       Diabetes Father      Social History     Socioeconomic History     Marital status:      Spouse name: Clayton      Number of children: Not on file     Years of education: Not on file     Highest education level: Not on file   Occupational History     Occupation: Instructor     Comment: [employer]    Social Needs     Financial resource strain: Not on file     Food insecurity     Worry: Not on file     Inability: Not on file     Transportation needs     Medical: Not on file     Non-medical: Not on file   Tobacco Use     Smoking status: Never Smoker     Smokeless tobacco: Never Used   Substance and Sexual Activity     Alcohol use: No     Alcohol/week: 0.0 standard drinks     Frequency: Never     Drug use: No     Sexual activity: Not Currently     Partners: Male     Birth control/protection: None   Lifestyle     Physical activity     Days per week: Not on file     Minutes per session: Not on file     Stress: Not on file   Relationships     Social connections     Talks on phone: Not on file     Gets together: Not on file     Attends Orthodoxy service: Not on file     Active member of club or organization: Not on file     Attends meetings of clubs or organizations: Not on file     Relationship status: Not on file     Intimate partner violence     Fear of current or ex partner: Not on file     Emotionally abused: Not on file     Physically abused: Not on file     Forced sexual activity: Not on file   Other Topics Concern      Service Not Asked     Blood Transfusions Not Asked     Caffeine Concern No     Occupational Exposure Not Asked     Hobby Hazards Not Asked     Sleep Concern Not Asked     Stress Concern Not Asked     Weight Concern Not Asked     Special Diet Not Asked     Back Care Not Asked     Exercise Yes     Comment: walking 30 minutes daily      Bike Helmet Not Asked     Seat Belt Not Asked     Self-Exams Not Asked     Parent/sibling w/ CABG, MI or angioplasty before 65F 55M? No   Social  "History Narrative    How much exercise per week? none    How much calcium per day? With food       How much caffeine per day? 1 cup    How much vitamin D per day? none    Do you/your family wear seatbelts?  Yes    Do you/your family use safety helmets? Yes    Do you/your family use sunscreen? Yes    Do you/your family keep firearms in the home? no    Do you/your family have a smoke detector(s)? Yes        Do you feel safe in your home? Yes    Has anyone ever touched you in an unwanted manner? No     Estrella Escalante 1/16/19    Reviewed cmckim lpn 1-          ROS: 10 point ROS neg other than the symptoms noted above in the HPI.    EXAM:  Blood pressure 108/73, pulse 84, height 1.6 m (5' 3\"), weight 59 kg (130 lb 1.6 oz), last menstrual period 11/13/2019  General - pleasant female in no acute distress.  Lungs - no increased work of breathing.  Heart - regular rate, well perfused.  Abdomen - soft, nontender, nondistended, no masses or organomegaly noted.  Musculoskeletal - no gross deformities.  Neurological - normal strength, sensation, and mental status.  Pelvic Exam:  EG/BUS: Normal genital architecture without lesions, erythema or abnormal secretions Bartholin's, Urethra, [place]'s normal   Urethral meatus: normal   Urethra: no masses, tenderness, or scarring   Bladder: no masses or tenderness   Vagina: moist, pink, rugae with creamy, white and odorless  secretions  Cervix: no lesions  Uterus: anteverted,   Rectum:anus normal     Imaging 10/16/20  TVUS  Uterus: 8.9 x 4.6 x 5.6 cm. Normal myometrial echogenicity and  appearance.  Endometrium: 11 millimeter with irregular thickened and cystic changes  in the lower uterine aspect.   Right Ovary:  2.4 x 1.7 x 2.2 cm.No suspicious ovarian lesion. Normal  color Doppler flow with low resistance arterial waveforms.  Left Ovary:  3.8 x 1.5 x 1.8 cm. No suspicious ovarian lesion. Normal  color Doppler flow with low resistance arterial waveforms. Simple  appearing 1.6 cm " follicle.   Urinary Bladder: Mildly distended and grossly unremarkable.  Free Fluid: none  IMPRESSION:      1. Endometrium thickness up to 11 mm in the lower uterine segment with  mixed solid/cystic change concerning for hyperplasia or malignancy in  this post menopausal patient.. Consider direct tissue sampling for  further evaluation. Some cystic changes also noted in the mid to lower  endometrium.     2. 1.6 cm left ovarian follicle appearing simple    Procedure: Endometrial Biopsy  Signed written consent obtained.   Speculum placed and cervix swabbed with betadinex3. Tenaculum placed at anterior lip of cervix. Endometrial biopsy Pipelle passed without difficulty through cervical os. 2 passes made with adequate specimen collected. Patient tolerated procedure well.      ASSESSMENT/PLAN: 44yo  female with postmenopausal bleeding.     PMB  -thickened endometrial stripe, 11mm. Discussed etiology including benign endometrial polyp, endometrial hyperplasia, and endometrial dysplasia. Discussed possibility of needing additional procedures.   -Endometrial biopsy collected without difficulty. Advised that pathology may take up to 1 week.   -Patient [facility]     Dr. Piedra present for procedure.     Alicia Myhre, DO  Obstetrics and Gynecology, PGY-4  October 22, 2020, 5:20 PM       I was present for and supervised the procedure.   Jolly Piedra MD

## 2020-10-22 ENCOUNTER — OFFICE VISIT (OUTPATIENT)
Dept: OBGYN | Facility: CLINIC | Age: 45
End: 2020-10-22
Payer: COMMERCIAL

## 2020-10-22 VITALS
HEIGHT: 63 IN | DIASTOLIC BLOOD PRESSURE: 73 MMHG | BODY MASS INDEX: 23.05 KG/M2 | WEIGHT: 130.1 LBS | SYSTOLIC BLOOD PRESSURE: 108 MMHG | HEART RATE: 84 BPM

## 2020-10-22 DIAGNOSIS — N95.0 POST-MENOPAUSAL BLEEDING: Primary | ICD-10-CM

## 2020-10-22 PROCEDURE — 58100 BIOPSY OF UTERUS LINING: CPT | Mod: GC | Performed by: OBSTETRICS & GYNECOLOGY

## 2020-10-22 PROCEDURE — G0463 HOSPITAL OUTPT CLINIC VISIT: HCPCS

## 2020-10-22 PROCEDURE — 88305 TISSUE EXAM BY PATHOLOGIST: CPT | Mod: 26 | Performed by: PATHOLOGY

## 2020-10-22 PROCEDURE — 58100 BIOPSY OF UTERUS LINING: CPT

## 2020-10-22 PROCEDURE — 88305 TISSUE EXAM BY PATHOLOGIST: CPT | Mod: TC | Performed by: OBSTETRICS & GYNECOLOGY

## 2020-10-22 PROCEDURE — G0463 HOSPITAL OUTPT CLINIC VISIT: HCPCS | Mod: 25

## 2020-10-22 RX ORDER — LEVOTHYROXINE SODIUM 50 UG/1
50 TABLET ORAL DAILY
COMMUNITY
Start: 2020-09-17

## 2020-10-22 ASSESSMENT — MIFFLIN-ST. JEOR: SCORE: 1199.26

## 2020-10-22 NOTE — PATIENT INSTRUCTIONS
Patient Education     Endometrial Biopsy    Endometrial biopsy is a procedure used to study the lining of the uterus. The uterus is also called the endometrium. The biopsy is usually done in your healthcare provider s office. During the biopsy, small tissue samples are taken from inside the uterus. These are then sent to a lab for study. If any problems are found, you and your healthcare provider will discuss treatment options. The biopsy usually takes only a few minutes, and you can often go back to your normal routine as soon as the procedure is over.  Reasons for the procedure  Endometrial biopsy may help pinpoint the cause of certain problems. These include:    Abnormal Pap test results    Bleeding after menopause    Bleeding associated with hormone therapy    Having certain types of cancer    Heavy or irregular menstrual periods    Prolonged bleeding    Trouble getting pregnant (fertility problems)    Damage to the uterine wall (very rare)  What are the risks?  Problems with endometrial biopsy are rare, but can include:    Bleeding    Damage to the uterine wall (very rare)    Infection    During the biopsy  During the biopsy, you will likely experience the following:    You will be asked to lie on an exam table with your knees bent, just as you do for a Pap test.    You may have a brief pelvic exam. An instrument called a speculum is then inserted into the vagina to hold it open.    An antiseptic solution may be applied to the cervix. The cervix may also be numbed with an anesthetic or dilated to widen the opening.    A small tube is passed through the cervix into the uterus.    It is normal to feel some cramping when the tube is inserted. But tell your healthcare provider if you have severe cramping or are very uncomfortable.    Using mild suction, samples are taken from the uterine lining. You may feel pinching or additional cramping when this is done.    The tube and speculum are then removed and the samples  are sent to a lab for study.  After the procedure  After the procedure, you may experience the following:    If you feel lightheaded or dizzy, you can rest on the table until you re ready to get dressed.    For a few hours, you may feel some mild cramping. This can usually be relieved with over-the-counter pain medicines.    You may have some bleeding for a few days. Use pads instead of tampons.    Don t douche or use any vaginal medicines unless your healthcare provider says it s OK.    Ask your healthcare provider when it s OK to have sex again.  Follow-up care  It will take about a week for the biopsy results to come back from the lab. Then you and your healthcare provider can discuss the results. These may show that no treatment is required. Or you may be scheduled for a follow-up appointment and more tests. If your biopsy was done for fertility problems, be sure to record the day when your next period begins.  Call your healthcare provider   Call your healthcare provider if you have any of the following:    Fever of 100.4 F (38 C) or higher, or as directed by your healthcare provider    Foul-smelling or unusual vaginal discharge    Heavy bleeding (soaking more than 1 pad an hour for 2 hours)    Severe cramping or increasing pain   Date Last Reviewed: 6/1/2017 2000-2019 [employer]. [address] [place] [address], [place] [address]. All rights reserved. This information is not intended as a substitute for professional medical care. Always follow your healthcare professional's instructions.

## 2020-10-26 LAB — COPATH REPORT: NORMAL

## 2021-01-02 ENCOUNTER — NURSE TRIAGE (OUTPATIENT)
Dept: NURSING | Facility: CLINIC | Age: 46
End: 2021-01-02

## 2021-01-02 NOTE — TELEPHONE ENCOUNTER
Called about head injury, states that she hit the top of her head on the counter yesterday.  States she has a little scalp swelling-caller denied any headache, confusion, vomiting, open area, loss of vision  or loss of memory.  Yazmin Umana RN  COVID 19 Nurse Triage Plan/Patient Instructions    Please be aware that novel coronavirus (COVID-19) may be circulating in the community. If you develop symptoms such as fever, cough, or SOB or if you have concerns about the presence of another infection including coronavirus (COVID-19), please contact your health care provider or visit [email or web address].     Disposition/Instructions    Home care recommended. Follow home care protocol based instructions.    Thank you for taking steps to prevent the spread of this virus.  o Limit your contact with others.  o Wear a simple mask to cover your cough.  o Wash your hands well and often.    Resources    [facility]: About COVID-19: [email or web address]    CDC: What to Do If You're Sick: www.cdc.gov/coronavirus/2019-ncov/about/steps-when-sick.html    CDC: Ending Home Isolation: www.cdc.gov/coronavirus/2019-ncov/hcp/disposition-in-home-patients.html     CDC: Caring for Someone: www.cdc.gov/coronavirus/2019-ncov/if-you-are-sick/care-for-someone.html     [facility]: Interim Guidance for Hospital Discharge to Home: www.health.[facility].[place]/diseases/coronavirus/hcp/hospdischarge.pdf    [facility] clinical trials (COVID-19 research studies): [email or web address].[facility]/umn-clinical-trials     Below are the COVID-19 hotlines at the [place] Department of Health ([facility]). Interpreters are available.   o For health questions: Call 241-586-4368 or 1-591.662.7204 (7 a.m. to 7 p.m.)  o For questions about schools and childcare: Call 953-482-9993 or 1-167.149.8552 (7 a.m. to 7 p.m.)                     Additional Information    Negative: [1] ACUTE NEURO SYMPTOM AND [2] present now  (DEFINITION: difficult to awaken OR confused  "thinking and talking OR slurred speech OR weakness of arms OR unsteady walking)    Negative: Knocked out (unconscious) > 1 minute    Negative: Seizure (convulsion) occurred  (Exception: prior history of seizures and now alert and without Acute Neuro Symptoms)    Negative: Penetrating head injury (e.g., knife, gun shot wound, metal object)    Negative: [1] Major bleeding (e.g., actively dripping or spurting) AND [2] can't be stopped    Negative: [1] Dangerous mechanism of injury (e.g., MVA, diving, trampoline, contact sports, fall > 10 feet or 3 meters) AND [2] NECK pain AND [3] began < 1 hour after injury    Negative: Sounds like a life-threatening emergency to the triager    Negative: [1] Recently examined and diagnosed with a concussion by a healthcare provider AND [2] questions about concussion symptoms    Negative: Can't remember what happened (amnesia)    Negative: Vomiting once or more    Negative: [1] Loss of vision or double vision AND [2] present now    Negative: Watery or blood-tinged fluid dripping from the NOSE or EARS now  (Exception: tears from crying or nosebleed from nasal trauma)    Negative: One or two \"black eyes\" (bruising, purple color of eyelids)    Negative: Large swelling or bruise > 2 inches (5 cm)    Negative: Skin is split open or gaping  (or length > 1/2 inch or 12 mm)    Negative: [1] Bleeding AND [2] won't stop after 10 minutes of direct pressure (using correct technique)    Negative: Sounds like a serious injury to the triager    Negative: [1] ACUTE NEURO SYMPTOM AND [2] now fine  (DEFINITION: difficult to awaken OR confused thinking and talking OR slurred speech OR weakness of arms OR unsteady walking)    Negative: [1] Knocked out (unconscious) < 1 minute AND [2] now fine    Negative: [1] SEVERE headache AND [2] not improved 2 hours after pain medicine/ice packs    Negative: Dangerous injury (e.g., MVA, diving, trampoline, contact sports, fall > 10 feet or 3 meters) or severe blow " from hard object (e.g., golf club or baseball bat)    Negative: Taking Coumadin (warfarin) or other strong blood thinner, or known bleeding disorder (e.g., thrombocytopenia)    Negative: Suspicious history for the injury    Negative: [1] Age over 65 years AND [2] swelling or bruise    Negative: Patient is confused or is an unreliable provider of information (e.g., dementia, profound mental retardation, alcohol intoxication)    Negative: [1] Last tetanus shot > 5 years ago AND [2] DIRTY cut or scrape    Negative: [1] After 72 hours AND [2] headache persists    Scalp swelling, bruise or pain    Protocols used: HEAD INJURY-A-AH

## 2021-02-28 ENCOUNTER — HEALTH MAINTENANCE LETTER (OUTPATIENT)
Age: 46
End: 2021-02-28

## 2021-04-24 ENCOUNTER — HEALTH MAINTENANCE LETTER (OUTPATIENT)
Age: 46
End: 2021-04-24

## 2021-06-16 ENCOUNTER — ANCILLARY PROCEDURE (OUTPATIENT)
Dept: MAMMOGRAPHY | Facility: CLINIC | Age: 46
End: 2021-06-16
Attending: OBSTETRICS & GYNECOLOGY
Payer: COMMERCIAL

## 2021-06-16 DIAGNOSIS — Z12.31 VISIT FOR SCREENING MAMMOGRAM: ICD-10-CM

## 2021-06-16 PROCEDURE — 77067 SCR MAMMO BI INCL CAD: CPT

## 2021-06-16 PROCEDURE — 77067 SCR MAMMO BI INCL CAD: CPT | Mod: 26 | Performed by: STUDENT IN AN ORGANIZED HEALTH CARE EDUCATION/TRAINING PROGRAM

## 2021-08-04 ENCOUNTER — VIRTUAL VISIT (OUTPATIENT)
Dept: UROLOGY | Facility: CLINIC | Age: 46
End: 2021-08-04
Attending: UROLOGY
Payer: COMMERCIAL

## 2021-08-04 DIAGNOSIS — M62.89 PELVIC FLOOR DYSFUNCTION: ICD-10-CM

## 2021-08-04 DIAGNOSIS — R39.15 URINARY URGENCY: ICD-10-CM

## 2021-08-04 DIAGNOSIS — N39.3 FEMALE STRESS INCONTINENCE: Primary | ICD-10-CM

## 2021-08-04 PROCEDURE — 99203 OFFICE O/P NEW LOW 30 MIN: CPT | Mod: 95 | Performed by: UROLOGY

## 2021-08-04 RX ORDER — HYDROCORTISONE 25 MG/G
OINTMENT TOPICAL
COMMUNITY
Start: 2021-07-27

## 2021-08-04 NOTE — PATIENT INSTRUCTIONS
Websites with free information:    [employer] patient website: www.voicesforpfd.org    Total Control Program: www.totalcontrolprogram.com    Please see one of the dedicated pelvic floor physical therapist ([facility] [phone])    Please return to see me at [facility] [address] [place] [address] [phone]    It was a pleasure meeting with you today.  Thank you for allowing me and my team the privilege of caring for you today.  YOU are the reason we are here, and I truly hope we provided you with the excellent service you deserve.  Please let us know if there is anything else we can do for you so that we can be sure you are leaving completely satisfied with your care experience.

## 2021-08-04 NOTE — PROGRESS NOTES
Dennise is a 46 year old who is being evaluated via a billable video visit.      How would you like to obtain your AVS? MyChart  If the video visit is dropped, the invitation should be resent by: Text to cell phone: n/a  Will anyone else be joining your video visit? No      Video Start Time: 10:26 AM    Assessment & Plan     Female stress incontinence  We discussed RYLEE and the treatments to include observation, weight loss, pelvic floor physical therapy, urethral bulking, and surgeries most commonly synthetic midurethral sling or autologous rectus fascial sling.  She is considering surgery but will start with resuming pelvic floor therapy.  Referral placed    Urinary urgency  Pelvic floor therapy helped this in the past so recommend restarting    Pelvic floor dysfunction  Pelvic floor therapy helped this in the past so recommend restarting    Return for in person.    16 minutes were spent in reviewing the recent OBGYN notes from Dr Myhre, the EMB results and direct patient care    Frida Chavarria MD MPH  (she/her/hers)   of Urology  [facility]    Subjective   Dennise is a 46 year old who presents for the following health issues     HPI     She is returning to see us about her stress incontinence.  She was last seen 2018.  Her symptoms of urgency had improved with PT but now with the symptoms returning and having more bother from her stress incontinence    Denies gross hematuria, UTI, vaginal bulge.  Does have some occasional dyspareunia.    Had a EMB in the fall and the records from the gyn visit were reviewed    Past Medical History:   Diagnosis Date     History of IBS     using probiotics     Hx of previous reproductive problem      Pyelonephritis     hospitalized mid 20's     Past Surgical History:   Procedure Laterality Date      SECTION N/A 3/15/2016    Procedure:  SECTION;  Surgeon: Maria R Andrade MD;  Location: [facility]     EYE SURGERY      Lasik     Social  History     Socioeconomic History     Marital status:      Spouse name: Clayton      Number of children: Not on file     Years of education: Not on file     Highest education level: Not on file   Occupational History     Occupation: Instructor     Comment: [place]    Tobacco Use     Smoking status: Never Smoker     Smokeless tobacco: Never Used   Vaping Use     Vaping Use: Never used   Substance and Sexual Activity     Alcohol use: No     Alcohol/week: 0.0 standard drinks     Drug use: No     Sexual activity: Not Currently     Partners: Male     Birth control/protection: None   Other Topics Concern      Service Not Asked     Blood Transfusions Not Asked     Caffeine Concern No     Occupational Exposure Not Asked     Hobby Hazards Not Asked     Sleep Concern Not Asked     Stress Concern Not Asked     Weight Concern Not Asked     Special Diet Not Asked     Back Care Not Asked     Exercise Yes     Comment: walking 30 minutes daily      Bike Helmet Not Asked     Seat Belt Not Asked     Self-Exams Not Asked     Parent/sibling w/ CABG, MI or angioplasty before 65F 55M? No   Social History Narrative    How much exercise per week? none    How much calcium per day? With food       How much caffeine per day? 1 cup    How much vitamin D per day? none    Do you/your family wear seatbelts?  Yes    Do you/your family use safety helmets? Yes    Do you/your family use sunscreen? Yes    Do you/your family keep firearms in the home? no    Do you/your family have a smoke detector(s)? Yes        Do you feel safe in your home? Yes    Has anyone ever touched you in an unwanted manner? No     Estrella Escalante 1/16/19    Reviewed cmckim lpn 1-         Social Determinants of Health     Financial Resource Strain:      Difficulty of Paying Living Expenses:    Food Insecurity:      Worried About Running Out of Food in the Last Year:      Ran Out of Food in the Last Year:    Transportation Needs:      Lack of  Transportation (Medical):      Lack of Transportation (Non-Medical):    Physical Activity:      Days of Exercise per Week:      Minutes of Exercise per Session:    Stress:      Feeling of Stress :    Social Connections:      Frequency of Communication with Friends and Family:      Frequency of Social Gatherings with Friends and Family:      Attends Baptism Services:      Active Member of Clubs or Organizations:      Attends Club or Organization Meetings:      Marital Status:    Intimate Partner Violence:      Fear of Current or Ex-Partner:      Emotionally Abused:      Physically Abused:      Sexually Abused:      Current Outpatient Medications   Medication     hydrocortisone 2.5 % ointment     levothyroxine (SYNTHROID/LEVOTHROID) 50 MCG tablet     No current facility-administered medications for this visit.        Allergies   Allergen Reactions     Dicloxacillin Hives         Objective         Vitals:  No vitals were obtained today due to virtual visit.    Physical Exam   GENERAL: healthy, alert and no distress  EYES: Eyes grossly normal to inspection, conjunctivae and sclerae normal  HENT: normal cephalic/atraumatic.  External ears, nose and mouth without ulcers or lesions.  RESP: no audible wheeze, cough, or visible cyanosis.  No visible retractions or increased work of breathing.  Able to speak fully in complete sentences.  NEURO: Cranial nerves grossly intact, mentation intact and speech normal  PSYCH: mentation appears normal, affect normal/bright, judgement and insight intact, normal speech and appearance well-groomed      Video-Visit Details    Type of service:  Video Visit    Video End Time:10:40 AM    Originating Location (pt. Location): Home    Distant Location (provider location):  [facility]     Platform used for Video Visit: Philip

## 2021-08-04 NOTE — LETTER
8/4/2021       RE: Dennise Burroughs  [address]  [place] [address]     Dear Colleague,    Thank you for referring your patient, Dennise Burroughs, to the [facility] [place] at [facility]. Please see a copy of my visit note below.    Dennise is a 46 year old who is being evaluated via a billable video visit.      How would you like to obtain your AVS? MyChart  If the video visit is dropped, the invitation should be resent by: Text to cell phone: n/a  Will anyone else be joining your video visit? No      Video Start Time: 10:26 AM    Assessment & Plan     Female stress incontinence  We discussed RYLEE and the treatments to include observation, weight loss, pelvic floor physical therapy, urethral bulking, and surgeries most commonly synthetic midurethral sling or autologous rectus fascial sling.  She is considering surgery but will start with resuming pelvic floor therapy.  Referral placed    Urinary urgency  Pelvic floor therapy helped this in the past so recommend restarting    Pelvic floor dysfunction  Pelvic floor therapy helped this in the past so recommend restarting    Return for in person.    16 minutes were spent in reviewing the recent OBGYN notes from Dr Myhre, the EMB results and direct patient care    Frida Chavarria MD MPH  (she/her/hers)   of Urology  [facility]    Subjective   Dennise is a 46 year old who presents for the following health issues     HPI     She is returning to see us about her stress incontinence.  She was last seen 2/2018.  Her symptoms of urgency had improved with PT but now with the symptoms returning and having more bother from her stress incontinence    Denies gross hematuria, UTI, vaginal bulge.  Does have some occasional dyspareunia.    Had a EMB in the fall and the records from the gyn visit were reviewed    Past Medical History:   Diagnosis Date     History of IBS      using probiotics     Hx of previous reproductive problem      Pyelonephritis     hospitalized mid 20's     Past Surgical History:   Procedure Laterality Date      SECTION N/A 3/15/2016    Procedure:  SECTION;  Surgeon: Maria R Andrade MD;  Location:  L+D     EYE SURGERY      Lasik     Social History     Socioeconomic History     Marital status:      Spouse name: Clayton      Number of children: Not on file     Years of education: Not on file     Highest education level: Not on file   Occupational History     Occupation: Instructor     Comment: [employer]    Tobacco Use     Smoking status: Never Smoker     Smokeless tobacco: Never Used   Vaping Use     Vaping Use: Never used   Substance and Sexual Activity     Alcohol use: No     Alcohol/week: 0.0 standard drinks     Drug use: No     Sexual activity: Not Currently     Partners: Male     Birth control/protection: None   Other Topics Concern      Service Not Asked     Blood Transfusions Not Asked     Caffeine Concern No     Occupational Exposure Not Asked     Hobby Hazards Not Asked     Sleep Concern Not Asked     Stress Concern Not Asked     Weight Concern Not Asked     Special Diet Not Asked     Back Care Not Asked     Exercise Yes     Comment: walking 30 minutes daily      Bike Helmet Not Asked     Seat Belt Not Asked     Self-Exams Not Asked     Parent/sibling w/ CABG, MI or angioplasty before 65F 55M? No   Social History Narrative    How much exercise per week? none    How much calcium per day? With food       How much caffeine per day? 1 cup    How much vitamin D per day? none    Do you/your family wear seatbelts?  Yes    Do you/your family use safety helmets? Yes    Do you/your family use sunscreen? Yes    Do you/your family keep firearms in the home? no    Do you/your family have a smoke detector(s)? Yes        Do you feel safe in your home? Yes    Has anyone ever touched you in an unwanted manner? No     Estrella  Boris 1/16/19    Reviewed cmckim lpn 1-         Social Determinants of Health     Financial Resource Strain:      Difficulty of Paying Living Expenses:    Food Insecurity:      Worried About Running Out of Food in the Last Year:      Ran Out of Food in the Last Year:    Transportation Needs:      Lack of Transportation (Medical):      Lack of Transportation (Non-Medical):    Physical Activity:      Days of Exercise per Week:      Minutes of Exercise per Session:    Stress:      Feeling of Stress :    Social Connections:      Frequency of Communication with Friends and Family:      Frequency of Social Gatherings with Friends and Family:      Attends Yazidism Services:      Active Member of Clubs or Organizations:      Attends Club or Organization Meetings:      Marital Status:    Intimate Partner Violence:      Fear of Current or Ex-Partner:      Emotionally Abused:      Physically Abused:      Sexually Abused:      Current Outpatient Medications   Medication     hydrocortisone 2.5 % ointment     levothyroxine (SYNTHROID/LEVOTHROID) 50 MCG tablet     No current facility-administered medications for this visit.        Allergies   Allergen Reactions     Dicloxacillin Hives         Objective         Vitals:  No vitals were obtained today due to virtual visit.    Physical Exam   GENERAL: healthy, alert and no distress  EYES: Eyes grossly normal to inspection, conjunctivae and sclerae normal  HENT: normal cephalic/atraumatic.  External ears, nose and mouth without ulcers or lesions.  RESP: no audible wheeze, cough, or visible cyanosis.  No visible retractions or increased work of breathing.  Able to speak fully in complete sentences.  NEURO: Cranial nerves grossly intact, mentation intact and speech normal  PSYCH: mentation appears normal, affect normal/bright, judgement and insight intact, normal speech and appearance well-groomed    Video-Visit Details    Type of service:  Video Visit    Video End Time:10:40  AM    Originating Location (pt. Location): Home    Distant Location (provider location):  [facility]   Platform used for Video Visit: Philip

## 2021-08-04 NOTE — NURSING NOTE
Has been stress incontinence  With run and jumping  Sometimes a lot.  Not wuth walking  This  Has been going on since 2019  Has gone to pelvic floor PT   Was a little improvement   Met you thinking about surgery    Thinking about surgery  Options.

## 2021-08-15 ENCOUNTER — APPOINTMENT (OUTPATIENT)
Dept: URGENT CARE | Facility: CLINIC | Age: 46
End: 2021-08-15
Payer: COMMERCIAL

## 2021-10-03 ENCOUNTER — HEALTH MAINTENANCE LETTER (OUTPATIENT)
Age: 46
End: 2021-10-03

## 2022-03-20 ENCOUNTER — HEALTH MAINTENANCE LETTER (OUTPATIENT)
Age: 47
End: 2022-03-20

## 2022-06-20 ENCOUNTER — ANCILLARY PROCEDURE (OUTPATIENT)
Dept: MAMMOGRAPHY | Facility: CLINIC | Age: 47
End: 2022-06-20
Attending: SURGERY
Payer: COMMERCIAL

## 2022-06-20 DIAGNOSIS — Z12.31 VISIT FOR SCREENING MAMMOGRAM: ICD-10-CM

## 2022-06-20 PROCEDURE — 77067 SCR MAMMO BI INCL CAD: CPT

## 2022-06-20 PROCEDURE — 77067 SCR MAMMO BI INCL CAD: CPT | Mod: 26 | Performed by: STUDENT IN AN ORGANIZED HEALTH CARE EDUCATION/TRAINING PROGRAM

## 2022-06-28 ENCOUNTER — TRANSFERRED RECORDS (OUTPATIENT)
Dept: HEALTH INFORMATION MANAGEMENT | Facility: CLINIC | Age: 47
End: 2022-06-28

## 2022-06-28 ENCOUNTER — MEDICAL CORRESPONDENCE (OUTPATIENT)
Dept: HEALTH INFORMATION MANAGEMENT | Facility: CLINIC | Age: 47
End: 2022-06-28

## 2022-06-29 ENCOUNTER — TRANSFERRED RECORDS (OUTPATIENT)
Dept: HEALTH INFORMATION MANAGEMENT | Facility: CLINIC | Age: 47
End: 2022-06-29

## 2022-06-29 LAB
ALT SERPL-CCNC: 45 U/L (ref 14–59)
AST SERPL-CCNC: 34 U/L (ref 0–45)
CHOLESTEROL (EXTERNAL): 288 MG/DL
CREATININE (EXTERNAL): 0.7 MG/DL (ref 0.55–1.02)
GFR ESTIMATED (EXTERNAL): >60 ML/MIN/1.73M2
GLUCOSE (EXTERNAL): 90 MG/DL (ref 70–124)
HBA1C MFR BLD: 5.5 % (ref 4.3–5.6)
HDLC SERPL-MCNC: 64 MG/DL (ref 40–999)
LDL CHOLESTEROL (EXTERNAL): 189 MG/DL (ref 0–130)
POTASSIUM (EXTERNAL): 4.6 MMOL/L (ref 3.4–5.3)
TRIGLYCERIDES (EXTERNAL): 177 MG/DL (ref 20–150)

## 2022-06-30 ENCOUNTER — TELEPHONE (OUTPATIENT)
Dept: GASTROENTEROLOGY | Facility: CLINIC | Age: 47
End: 2022-06-30

## 2022-06-30 ENCOUNTER — TRANSCRIBE ORDERS (OUTPATIENT)
Dept: OTHER | Age: 47
End: 2022-06-30

## 2022-06-30 DIAGNOSIS — Z00.121 ENCOUNTER FOR WCC (WELL CHILD CHECK) WITH ABNORMAL FINDINGS: Primary | ICD-10-CM

## 2022-06-30 NOTE — TELEPHONE ENCOUNTER
Screening Questions    BlueKIND OF PREP RedLOCATION [review exclusion criteria] GreenSEDATION TYPE      1. Are you active on mychart? Y    2. What insurance is in the chart? [employer]     3.   Ordering/Referring Provider: Flakita May MD    4. BMI   (If greater than 40 review exclusion criteria [PAC APPT IF [MAC] @ [facility])  23.0  [If yes, BMI OVER 40-EXTENDED PREP]      **(Sedation review/consideration needed)**  Do you have a legal guardian or Medical Power of    and/or are you able to give consent for your medical care?     Y    5. Have you had a positive covid test in the last 90 days?   N -     6.  Are you currently on dialysis?   N [ If yes, G-PREP & HOSPITAL setting ONLY]     7.  Do you have chronic kidney disease?  N [ If yes, G-PREP ]    8.   Do you have a diagnosis of diabetes?   N   [ If yes, G-PREP ]    9.  On a regular basis do you go 3-5 days between bowel movements?   Y   [ If yes, EXTENDED PREP]    10.  Are you taking any prescription pain medications on a routine schedule?    N -  [ If yes, EXTENDED PREP] [If yes, MAC]      11.   Do you have any chemical dependencies such as alcohol, street drugs, or methadone?    N [If yes, MAC]    12.   Do you have any history of post-traumatic stress syndrome, severe anxiety or history of psychosis?    N  [If yes, MAC]    13.  [FEMALES] Are you currently pregnant? N    If yes, how many weeks?       Respiratory/Heart Screening:  [If yes to any of the following HOSPITAL setting only]     14. Do you have Pulmonary Hypertension [Lungs]?   N       15. Do you have UNCONTROLLED asthma?   N     16.  Do you use daily home oxygen?  N      17. Do you have mod to severe Obstructive Sleep Apnea?         (OKAY @ [facility]  [facility]  SH  PH  RI  MG - if pt is not on OXYGEN)  N      18.   Have you had a heart or lung transplant?   N      19.   Have you had a stroke or Transient ischemic attack (TIA - aka  mini stroke ) within 6 months?  (If yes, please review exclusion  criteria)  N     20.   In the past 6 months, have you had any heart related issues including cardiomyopathy or heart attack?   N           If yes, did it require cardiac stenting or other implantable device?   NA      21.   Do you have any implantable devices in your body (pacemaker, defib, LVAD)? (If yes, please review exclusion criteria)  N     22. Do you take nitroglycerin?   N           If yes, how often? NA  (if yes, HOSPITAL setting ONLY)    23.  Are you currently taking any blood thinners?    [If yes, INFORM patient to follw up w/ ORDERING PROVIDER FOR BRIDGING INSTRUCTIONS]     N    24.   Do you transfer independently?                (If NO, please HOSPITAL setting ONLY)  Y    25.   Preferred LOCAL Pharmacy for Pre Prescription:      [phone]    Scheduling Details  (Please ask for phone number if not scheduled by patient)      Caller : Dennise Burroughs    Date of Procedure: 8/25  Surgeon: Nisha  [place] [facility]        Sedation Type: CS l Per Protocol  Conscious Sedation- Needs  for 6 hours after the procedure  MAC/General-Needs  for 24 hours after procedure    N :[Pre-op Required] at [facility]  SH  MG and OR for MAC sedation   (advise patient they will need a pre-op WITH IN 30 DAYS of procedure date)     Type of Procedure Scheduled:   Lower Endoscopy [Colonoscopy]    Which Colonoscopy Prep was Sent?:   Extended - Per Protocol    GONZALES CF PATIENTS & GROEN'S PATIENTS NEEDS EXTENDED PREP       Informed patient they will need an adult  Y  Cannot take any type of public or medical transportation alone    [facility] or Externally: Y  **INFORMED OF HOME TESTING & LAB OPTION**        Confirmed Nurse will call to complete assessment Y    Additional comments:

## 2022-08-16 ENCOUNTER — TELEPHONE (OUTPATIENT)
Dept: GASTROENTEROLOGY | Facility: CLINIC | Age: 47
End: 2022-08-16

## 2022-08-16 DIAGNOSIS — Z12.11 SPECIAL SCREENING FOR MALIGNANT NEOPLASMS, COLON: Primary | ICD-10-CM

## 2022-08-16 RX ORDER — BISACODYL 5 MG/1
TABLET, DELAYED RELEASE ORAL
Qty: 4 TABLET | Refills: 0 | Status: SHIPPED | OUTPATIENT
Start: 2022-08-16

## 2022-08-16 NOTE — TELEPHONE ENCOUNTER
Patient scheduled for colonoscopy on 8.25.22.     Covid test scheduled ?. Discuss at home test option.     Arrival time: 0950    Facility location: [facility]    Sedation type: Cs    Indication for procedure: Screening    Anticoagulations? No     Bowel prep recommendation: Standard golytely d/t mag citrate recall    Golytely prep sent to  on [place] pharmacy. Prep instructions sent via [employer]    Pre visit planning completed.    Daphney Flores RN

## 2022-08-17 NOTE — TELEPHONE ENCOUNTER
Attempted to contact patient regarding upcoming colonoscopy procedure on 8.25.22 for pre assessment questions. No answer.     Left message to return call to [phone] #[phone]    Daphney Flores RN

## 2022-08-18 NOTE — TELEPHONE ENCOUNTER
Pre assessment questions completed for upcoming colonoscopy  procedure scheduled on 8/25/22    COVID policy reviewed. Patient to complete rapid antigen test one to two days before their scheduled procedure. Patient to bring photo of the results when they come in for their procedure.    Reviewed procedural arrival time 0950 and facility location [facility].    Designated  policy reviewed. Instructed to have someone stay 6 hours post procedure.     Reviewed Golytely prep instructions with patient. No fiber/iron supplements or foods that contain nuts/seeds 7 days prior to procedure.     Patient verbalized understanding and had no questions or concerns at this time.    Myra Mohr RN

## 2022-08-25 ENCOUNTER — HOSPITAL ENCOUNTER (OUTPATIENT)
Facility: AMBULATORY SURGERY CENTER | Age: 47
Discharge: HOME | End: 2022-08-25
Attending: INTERNAL MEDICINE
Payer: COMMERCIAL

## 2022-08-25 ENCOUNTER — ANESTHESIA (OUTPATIENT)
Dept: SURGERY | Facility: AMBULATORY SURGERY CENTER | Age: 47
End: 2022-08-25
Payer: COMMERCIAL

## 2022-08-25 ENCOUNTER — ANESTHESIA EVENT (OUTPATIENT)
Dept: SURGERY | Facility: AMBULATORY SURGERY CENTER | Age: 47
End: 2022-08-25
Payer: COMMERCIAL

## 2022-08-25 VITALS
TEMPERATURE: 97.1 F | WEIGHT: 121 LBS | HEIGHT: 63 IN | RESPIRATION RATE: 17 BRPM | DIASTOLIC BLOOD PRESSURE: 70 MMHG | OXYGEN SATURATION: 98 % | SYSTOLIC BLOOD PRESSURE: 100 MMHG | BODY MASS INDEX: 21.44 KG/M2 | HEART RATE: 65 BPM

## 2022-08-25 VITALS — HEART RATE: 69 BPM

## 2022-08-25 LAB — COLONOSCOPY: NORMAL

## 2022-08-25 PROCEDURE — 45378 DIAGNOSTIC COLONOSCOPY: CPT | Mod: 33

## 2022-08-25 RX ORDER — ONDANSETRON 2 MG/ML
4 INJECTION INTRAMUSCULAR; INTRAVENOUS
Status: DISCONTINUED | OUTPATIENT
Start: 2022-08-25 | End: 2022-08-26 | Stop reason: HOSPADM

## 2022-08-25 RX ORDER — PROPOFOL 10 MG/ML
INJECTION, EMULSION INTRAVENOUS CONTINUOUS PRN
Status: DISCONTINUED | OUTPATIENT
Start: 2022-08-25 | End: 2022-08-25

## 2022-08-25 RX ORDER — PROPOFOL 10 MG/ML
INJECTION, EMULSION INTRAVENOUS PRN
Status: DISCONTINUED | OUTPATIENT
Start: 2022-08-25 | End: 2022-08-25

## 2022-08-25 RX ORDER — SODIUM CHLORIDE, SODIUM LACTATE, POTASSIUM CHLORIDE, CALCIUM CHLORIDE 600; 310; 30; 20 MG/100ML; MG/100ML; MG/100ML; MG/100ML
INJECTION, SOLUTION INTRAVENOUS CONTINUOUS PRN
Status: DISCONTINUED | OUTPATIENT
Start: 2022-08-25 | End: 2022-08-25

## 2022-08-25 RX ORDER — SIMETHICONE
LIQUID (ML) MISCELLANEOUS PRN
Status: DISCONTINUED | OUTPATIENT
Start: 2022-08-25 | End: 2022-08-25 | Stop reason: HOSPADM

## 2022-08-25 RX ORDER — LIDOCAINE HYDROCHLORIDE 20 MG/ML
INJECTION, SOLUTION INFILTRATION; PERINEURAL PRN
Status: DISCONTINUED | OUTPATIENT
Start: 2022-08-25 | End: 2022-08-25

## 2022-08-25 RX ORDER — LIDOCAINE 40 MG/G
CREAM TOPICAL
Status: DISCONTINUED | OUTPATIENT
Start: 2022-08-25 | End: 2022-08-26 | Stop reason: HOSPADM

## 2022-08-25 RX ADMIN — PROPOFOL 40 MG: 10 INJECTION, EMULSION INTRAVENOUS at 10:18

## 2022-08-25 RX ADMIN — PROPOFOL 125 MCG/KG/MIN: 10 INJECTION, EMULSION INTRAVENOUS at 10:18

## 2022-08-25 RX ADMIN — SODIUM CHLORIDE, SODIUM LACTATE, POTASSIUM CHLORIDE, CALCIUM CHLORIDE: 600; 310; 30; 20 INJECTION, SOLUTION INTRAVENOUS at 10:15

## 2022-08-25 RX ADMIN — LIDOCAINE HYDROCHLORIDE 60 MG: 20 INJECTION, SOLUTION INFILTRATION; PERINEURAL at 10:18

## 2022-08-25 NOTE — ANESTHESIA PREPROCEDURE EVALUATION
Anesthesia Pre-Procedure Evaluation    Patient: Dennise Burroughs   MRN: [id number] : 1975        Procedure : Procedure(s):  COLONOSCOPY          Past Medical History:   Diagnosis Date     History of IBS     using probiotics     Hx of previous reproductive problem      Pyelonephritis     hospitalized mid 20's      Past Surgical History:   Procedure Laterality Date      SECTION N/A 3/15/2016    Procedure:  SECTION;  Surgeon: Maria R Andrade MD;  Location: [facility]     EYE SURGERY      Lasik      Allergies   Allergen Reactions     Dicloxacillin Hives      Social History     Tobacco Use     Smoking status: Never Smoker     Smokeless tobacco: Never Used   Substance Use Topics     Alcohol use: No     Alcohol/week: 0.0 standard drinks      Wt Readings from Last 1 Encounters:   22 54.9 kg (121 lb)        Anesthesia Evaluation            ROS/MED HX  ENT/Pulmonary:       Neurologic:       Cardiovascular:     (+) hypertension-range: gestational/ ----    METS/Exercise Tolerance:     Hematologic:       Musculoskeletal:       GI/Hepatic:  - neg GI/hepatic ROS   (+) bowel prep,     Renal/Genitourinary:  - neg Renal ROS     Endo:       Psychiatric/Substance Use:       Infectious Disease:       Malignancy:       Other:               OUTSIDE LABS:  CBC:   Lab Results   Component Value Date    WBC 14.9 (H) 2016    WBC 14.8 (H) 2016    HGB 12.8 2016    HGB 11.3 (L) 2016    HCT 36.5 2016    HCT 32.9 (L) 2016     2016     (L) 2016     BMP:   Lab Results   Component Value Date    CR 0.98 2016    CR 1.16 (H) 2016    GLC 96 2020     COAGS: No results found for: PTT, INR, FIBR  POC: No results found for: BGM, HCG, HCGS  HEPATIC:   Lab Results   Component Value Date    ALBUMIN 2.7 (L) 2016    PROTTOTAL 7.2 2016    ALT 12 2016    AST 38 2016    ALKPHOS 157 (H) 2016    BILITOTAL 0.5 2016     OTHER:   Lab  Results   Component Value Date    MAG 2.7 (H) 03/16/2016    TSH 6.14 (H) 01/27/2020    T4 0.89 01/27/2020       Anesthesia Plan    ASA Status:  2      Anesthesia Type: MAC.     - Reason for MAC: immobility needed              Consents    Anesthesia Plan(s) and associated risks, benefits, and realistic alternatives discussed. Questions answered and patient/representative(s) expressed understanding.     - Discussed: Risks, Benefits and Alternatives for BOTH SEDATION and the PROCEDURE were discussed     - Discussed with:  Patient      - Extended Intubation/Ventilatory Support Discussed: No.      - Patient is DNR/DNI Status: No    Use of blood products discussed: No .     Postoperative Care    Pain management: Oral pain medications.        Comments:           H&P reviewed: Unable to attach H&P to encounter due to EHR limitations. H&P Update: appropriate H&P reviewed, patient examined. No interval changes since H&P (within 30 days).         Hu Mayer MD, MD

## 2022-08-25 NOTE — ANESTHESIA POSTPROCEDURE EVALUATION
Patient: Dennise Burroughs    Procedure: Procedure(s):  COLONOSCOPY       Anesthesia Type:  MAC    Note:  Disposition: Outpatient   Postop Pain Control: Uneventful            Sign Out: Well controlled pain   PONV: No   Neuro/Psych: Uneventful            Sign Out: Acceptable/Baseline neuro status   Airway/Respiratory: Uneventful            Sign Out: Acceptable/Baseline resp. status   CV/Hemodynamics: Uneventful            Sign Out: Acceptable CV status; No obvious hypovolemia; No obvious fluid overload   Other NRE: NONE   DID A NON-ROUTINE EVENT OCCUR? No           Last vitals:  Vitals Value Taken Time   /70 08/25/22 1110   Temp 36.2  C (97.1  F) 08/25/22 1110   Pulse 65 08/25/22 1110   Resp 17 08/25/22 1110   SpO2 98 % 08/25/22 1110       Electronically Signed By: Hu Mayer MD, MD  August 25, 2022  1:25 PM  
No

## 2022-08-25 NOTE — ANESTHESIA CARE TRANSFER NOTE
Patient: Dennise Burroughs    Procedure: Procedure(s):  COLONOSCOPY       Diagnosis: Encounter for well adult exam with abnormal findings [Z00.01]  Diagnosis Additional Information: No value filed.    Anesthesia Type:   MAC     Note:      Level of Consciousness: drowsy  Oxygen Supplementation: room air    Independent Airway: airway patency satisfactory and stable        Patient transferred to: Phase II    Handoff Report: Identifed the Patient, Identified the Reponsible Provider, Reviewed the pertinent medical history, Discussed the surgical course, Reviewed Intra-OP anesthesia mangement and issues during anesthesia, Set expectations for post-procedure period and Allowed opportunity for questions and acknowledgement of understanding      Vitals:  Vitals Value Taken Time   BP     Temp     Pulse     Resp     SpO2         Electronically Signed By: SHAGGY Anderson CRNA  August 25, 2022  10:49 AM

## 2022-08-25 NOTE — H&P
Dennise Burroughs  [id number]  female  47 year old      Reason for procedure/surgery: Screening colonoscopy.    Patient Active Problem List   Diagnosis     Female infertility     Elderly primigravida in second trimester     Pregnancy resulting from assisted reproductive technology in second trimester     Positive GBS test     Encounter for triage in pregnant patient     Polyhydramnios in third trimester, fetus 1     Supervision of high-risk pregnancy of elderly multigravida, third trimester     Advanced maternal age, 1st pregnancy     S/P  section     Somatic dysfunction of pelvic region     Myalgia of pelvic floor     Urinary frequency     Female stress incontinence       Past Surgical History:    Past Surgical History:   Procedure Laterality Date      SECTION N/A 3/15/2016    Procedure:  SECTION;  Surgeon: Maria R Andrade MD;  Location:  L+D     EYE SURGERY      [facility]       Past Medical History:   Past Medical History:   Diagnosis Date     History of IBS     using probiotics     Hx of previous reproductive problem      Pyelonephritis     hospitalized mid 20's       Social History:   Social History     Tobacco Use     Smoking status: Never Smoker     Smokeless tobacco: Never Used   Substance Use Topics     Alcohol use: No     Alcohol/week: 0.0 standard drinks       Family History:   Family History   Problem Relation Age of Onset     Hypertension Father      Diabetes Father        Allergies:   Allergies   Allergen Reactions     Dicloxacillin Hives       Active Medications:   Current Outpatient Medications   Medication Sig Dispense Refill     bisacodyl (DULCOLAX) 5 MG EC tablet Take as directed. One day before exam take 2 tablets at 3 PM. Take 2 tablets at 11 PM. 4 tablet 0     hydrocortisone 2.5 % ointment APPLY TO AFFECTED AREA TWICE DAILY FOR UP TO TWO WEEKS       levothyroxine (SYNTHROID/LEVOTHROID) 50 MCG tablet Take 50 mcg by mouth daily       polyethylene glycol (GOLYTELY) 236 g  "suspension Take as directed. One day before exam fill the jug with water. Cover and shake until well mixed. At 6 PM start drinking an 8oz glass of mixture every 15 minutes until jug is 1/2 empty. Store remainder in the refrigerator.  At 11 PM Start drinking the other half of the Golytely jug. Drink one 8-ounce glass every 15 minutes until the jug is empty. 4000 mL 0       Systemic Review:   CONSTITUTIONAL: NEGATIVE for fever, chills, change in weight  ENT/MOUTH: NEGATIVE for ear, mouth and throat problems  RESP: NEGATIVE for significant cough or SOB  CV: NEGATIVE for chest pain, palpitations or peripheral edema    Physical Examination:   Vital Signs: /71 (BP Location: Right arm)   Pulse 70   Temp 97.2  F (36.2  C) (Skin)   Resp 15   Ht 1.6 m (5' 3\")   Wt 54.9 kg (121 lb)   LMP 11/13/2019   SpO2 98%   BMI 21.43 kg/m    GENERAL: healthy, alert and no distress  NECK: no adenopathy, no asymmetry, masses, or scars  RESP: lungs clear to auscultation - no rales, rhonchi or wheezes  CV: regular rate and rhythm, normal S1 S2, no S3 or S4, no murmur, click or rub, no peripheral edema and peripheral pulses strong  ABDOMEN: soft, nontender, no hepatosplenomegaly, no masses and bowel sounds normal  MS: no gross musculoskeletal defects noted, no edema    Plan: Appropriate to proceed as scheduled.      Felicitas Cameron MD  8/25/2022    [facility]    "

## 2022-09-10 ENCOUNTER — HEALTH MAINTENANCE LETTER (OUTPATIENT)
Age: 47
End: 2022-09-10

## 2023-04-30 ENCOUNTER — HEALTH MAINTENANCE LETTER (OUTPATIENT)
Age: 48
End: 2023-04-30

## 2023-06-21 ENCOUNTER — TRANSFERRED RECORDS (OUTPATIENT)
Dept: HEALTH INFORMATION MANAGEMENT | Facility: CLINIC | Age: 48
End: 2023-06-21

## 2023-06-21 ENCOUNTER — ANCILLARY PROCEDURE (OUTPATIENT)
Dept: MAMMOGRAPHY | Facility: CLINIC | Age: 48
End: 2023-06-21
Attending: SURGERY
Payer: COMMERCIAL

## 2023-06-21 DIAGNOSIS — Z12.31 VISIT FOR SCREENING MAMMOGRAM: ICD-10-CM

## 2023-06-21 PROCEDURE — 77067 SCR MAMMO BI INCL CAD: CPT | Mod: 26 | Performed by: STUDENT IN AN ORGANIZED HEALTH CARE EDUCATION/TRAINING PROGRAM

## 2023-06-21 PROCEDURE — 77067 SCR MAMMO BI INCL CAD: CPT

## 2023-10-10 ENCOUNTER — OFFICE VISIT (OUTPATIENT)
Dept: URGENT CARE | Facility: URGENT CARE | Age: 48
End: 2023-10-10
Payer: COMMERCIAL

## 2023-10-10 VITALS
DIASTOLIC BLOOD PRESSURE: 78 MMHG | BODY MASS INDEX: 21.79 KG/M2 | SYSTOLIC BLOOD PRESSURE: 113 MMHG | HEIGHT: 63 IN | WEIGHT: 123 LBS | OXYGEN SATURATION: 99 % | HEART RATE: 78 BPM | TEMPERATURE: 98.1 F

## 2023-10-10 DIAGNOSIS — R05.1 ACUTE COUGH: Primary | ICD-10-CM

## 2023-10-10 DIAGNOSIS — R07.0 THROAT PAIN: ICD-10-CM

## 2023-10-10 DIAGNOSIS — J06.9 VIRAL URI WITH COUGH: ICD-10-CM

## 2023-10-10 LAB
BASO+EOS+MONOS # BLD AUTO: NORMAL 10*3/UL
BASO+EOS+MONOS NFR BLD AUTO: NORMAL %
BASOPHILS # BLD AUTO: 0 10E3/UL (ref 0–0.2)
BASOPHILS NFR BLD AUTO: 1 %
DEPRECATED S PYO AG THROAT QL EIA: NEGATIVE
EOSINOPHIL # BLD AUTO: 0.1 10E3/UL (ref 0–0.7)
EOSINOPHIL NFR BLD AUTO: 2 %
ERYTHROCYTE [DISTWIDTH] IN BLOOD BY AUTOMATED COUNT: 11.7 % (ref 10–15)
GROUP A STREP BY PCR: NOT DETECTED
HCT VFR BLD AUTO: 35.7 % (ref 35–47)
HGB BLD-MCNC: 11.9 G/DL (ref 11.7–15.7)
IMM GRANULOCYTES # BLD: 0 10E3/UL
IMM GRANULOCYTES NFR BLD: 0 %
LYMPHOCYTES # BLD AUTO: 1.3 10E3/UL (ref 0.8–5.3)
LYMPHOCYTES NFR BLD AUTO: 30 %
MCH RBC QN AUTO: 29.8 PG (ref 26.5–33)
MCHC RBC AUTO-ENTMCNC: 33.3 G/DL (ref 31.5–36.5)
MCV RBC AUTO: 89 FL (ref 78–100)
MONOCYTES # BLD AUTO: 0.4 10E3/UL (ref 0–1.3)
MONOCYTES NFR BLD AUTO: 9 %
NEUTROPHILS # BLD AUTO: 2.6 10E3/UL (ref 1.6–8.3)
NEUTROPHILS NFR BLD AUTO: 58 %
PLATELET # BLD AUTO: 335 10E3/UL (ref 150–450)
RBC # BLD AUTO: 4 10E6/UL (ref 3.8–5.2)
WBC # BLD AUTO: 4.4 10E3/UL (ref 4–11)

## 2023-10-10 PROCEDURE — 99204 OFFICE O/P NEW MOD 45 MIN: CPT | Performed by: NURSE PRACTITIONER

## 2023-10-10 PROCEDURE — 85025 COMPLETE CBC W/AUTO DIFF WBC: CPT | Performed by: NURSE PRACTITIONER

## 2023-10-10 PROCEDURE — 87651 STREP A DNA AMP PROBE: CPT | Performed by: NURSE PRACTITIONER

## 2023-10-10 PROCEDURE — 36415 COLL VENOUS BLD VENIPUNCTURE: CPT | Performed by: NURSE PRACTITIONER

## 2023-10-10 RX ORDER — ATORVASTATIN CALCIUM 10 MG/1
TABLET, FILM COATED ORAL
COMMUNITY
Start: 2022-10-03

## 2023-10-10 RX ORDER — BENZONATATE 200 MG/1
200 CAPSULE ORAL 3 TIMES DAILY PRN
Qty: 21 CAPSULE | Refills: 0 | Status: SHIPPED | OUTPATIENT
Start: 2023-10-10 | End: 2023-10-17

## 2023-10-10 NOTE — PATIENT INSTRUCTIONS
Your symptoms appear to be viral at this time.  CBC and strep are negative normal -no indication of bacterial infection  Cepacol lozenges for sore throat  Throat coat tea with honey ice drinks  Tessalon Perles for cough  Zyrtec Claritin or Allegra in the morning Benadryl at night  Flonase (fluticasone) 2 sprays in each nostril daily until symptoms resolve, then continue 1 spray in each nostril for at least 5 more days.  Take Tylenol or an NSAID such as ibuprofen or naproxen as needed for pain.  May use netti pot with bottled or distilled water and saline packets to flush sinuses.  Sudafed (pseudoephedrine) behind the pharmacist counter for 3-5 days helps relieve congestion.  Afrin (oxymetazoline) nasal spray twice daily for 3 days. Stop after 3 days.  Mucinex (guiafenesin) thins mucus and may help it to loosen more quickly  Saline drops or nasal sprays may loosen mucus.  Sit in the bathroom with the door closed and hot shower running to loosen mucus.  Contact primary care clinic if you do not have any relief from your symptoms after 10 days.  Present to emergency room for significantly increasing pain, persistent high fever >102F, swelling/redness around your eyes, changes in your vision or ability to move your eyes, altered mental status or a severe headache.

## 2023-10-10 NOTE — PROGRESS NOTES
"Chief Complaint   Patient presents with    Urgent Care     X1 week of congestion, post nasal drip, cough   2 negative at home tests      SUBJECTIVE:  Dennise Burroughs is a 48 year old female presenting with cough cold congestion postnasal drip sore throat headache malaise over the last 2 weeks worsening.  Had negative COVID testing.  No chest pain shortness of breath hemoptysis.  No asthma or smoking.    Past Medical History:   Diagnosis Date    History of IBS     using probiotics    Hx of previous reproductive problem     Pyelonephritis     hospitalized mid 20's     atorvastatin (LIPITOR) 10 MG tablet,   levothyroxine (SYNTHROID/LEVOTHROID) 50 MCG tablet, Take 50 mcg by mouth daily  bisacodyl (DULCOLAX) 5 MG EC tablet, Take as directed. One day before exam take 2 tablets at 3 PM. Take 2 tablets at 11 PM.  hydrocortisone 2.5 % ointment, APPLY TO AFFECTED AREA TWICE DAILY FOR UP TO TWO WEEKS (Patient not taking: Reported on 10/10/2023)  polyethylene glycol (GOLYTELY) 236 g suspension, Take as directed. One day before exam fill the jug with water. Cover and shake until well mixed. At 6 PM start drinking an 8oz glass of mixture every 15 minutes until jug is 1/2 empty. Store remainder in the refrigerator.  At 11 PM Start drinking the other half of the Golytely jug. Drink one 8-ounce glass every 15 minutes until the jug is empty.    No current facility-administered medications on file prior to visit.    Social History     Tobacco Use    Smoking status: Never    Smokeless tobacco: Never   Substance Use Topics    Alcohol use: No     Alcohol/week: 0.0 standard drinks of alcohol     Allergies   Allergen Reactions    Dicloxacillin Hives       Review of Systems  All systems negative except for those listed above in HPI.    OBJECTIVE:   /78   Pulse 78   Temp 98.1  F (36.7  C) (Temporal)   Ht 1.6 m (5' 3\")   Wt 55.8 kg (123 lb)   LMP 11/13/2019   SpO2 99%   BMI 21.79 kg/m      Physical Exam  Vitals reviewed. "   Constitutional:       Appearance: Normal appearance.   HENT:      Head: Normocephalic and atraumatic.      Right Ear: Tympanic membrane and ear canal normal.      Left Ear: Tympanic membrane and ear canal normal.      Nose: Congestion and rhinorrhea present.      Mouth/Throat:      Mouth: Mucous membranes are moist.      Pharynx: Oropharynx is clear. Posterior oropharyngeal erythema present. No oropharyngeal exudate.   Cardiovascular:      Rate and Rhythm: Normal rate.      Pulses: Normal pulses.   Pulmonary:      Effort: Pulmonary effort is normal. No respiratory distress.      Breath sounds: No stridor. No wheezing, rhonchi or rales.   Chest:      Chest wall: No tenderness.   Musculoskeletal:         General: Normal range of motion.   Lymphadenopathy:      Cervical: Cervical adenopathy present.   Skin:     General: Skin is warm and dry.      Findings: No rash.   Neurological:      General: No focal deficit present.      Mental Status: She is alert and oriented to person, place, and time.   Psychiatric:         Mood and Affect: Mood normal.         Behavior: Behavior normal.       Results for orders placed or performed in visit on 10/10/23   CBC with platelets and differential     Status: None   Result Value Ref Range    WBC Count 4.4 4.0 - 11.0 10e3/uL    RBC Count 4.00 3.80 - 5.20 10e6/uL    Hemoglobin 11.9 11.7 - 15.7 g/dL    Hematocrit 35.7 35.0 - 47.0 %    MCV 89 78 - 100 fL    MCH 29.8 26.5 - 33.0 pg    MCHC 33.3 31.5 - 36.5 g/dL    RDW 11.7 10.0 - 15.0 %    Platelet Count 335 150 - 450 10e3/uL    % Neutrophils 58 %    % Lymphocytes 30 %    % Monocytes 9 %    Mids % (Monos, Eos, Basos)      % Eosinophils 2 %    % Basophils 1 %    % Immature Granulocytes 0 %    Absolute Neutrophils 2.6 1.6 - 8.3 10e3/uL    Absolute Lymphocytes 1.3 0.8 - 5.3 10e3/uL    Absolute Monocytes 0.4 0.0 - 1.3 10e3/uL    Mids Abs (Monos, Eos, Basos)      Absolute Eosinophils 0.1 0.0 - 0.7 10e3/uL    Absolute Basophils 0.0 0.0 - 0.2  10e3/uL    Absolute Immature Granulocytes 0.0 <=0.4 10e3/uL   Streptococcus A Rapid Screen w/Reflex to PCR - Clinic Collect     Status: Normal    Specimen: Throat; Swab   Result Value Ref Range    Group A Strep antigen Negative Negative   CBC with platelets and differential     Status: None    Narrative    The following orders were created for panel order CBC with platelets and differential.  Procedure                               Abnormality         Status                     ---------                               -----------         ------                     CBC with platelets and d...[[id number]]                      Final result                 Please view results for these tests on the individual orders.     ASSESSMENT:    ICD-10-CM    1. Acute cough  R05.1 benzonatate (TESSALON) 200 MG capsule      2. Throat pain  R07.0 CBC with platelets and differential     Streptococcus A Rapid Screen w/Reflex to PCR - Clinic Collect     CBC with platelets and differential     Group A Streptococcus PCR Throat Swab      3. Viral URI with cough  J06.9         PLAN:     Your symptoms appear to be viral at this time.  CBC and strep are negative normal -no indication of bacterial infection  Cepacol lozenges for sore throat  Throat coat tea with honey ice drinks  Tessalon Perles for cough  Zyrtec Claritin or Allegra in the morning Benadryl at night  Flonase (fluticasone) 2 sprays in each nostril daily until symptoms resolve, then continue 1 spray in each nostril for at least 5 more days.  Take Tylenol or an NSAID such as ibuprofen or naproxen as needed for pain.  May use netti pot with bottled or distilled water and saline packets to flush sinuses.  Sudafed (pseudoephedrine) behind the pharmacist counter for 3-5 days helps relieve congestion.  Afrin (oxymetazoline) nasal spray twice daily for 3 days. Stop after 3 days.  Mucinex (guiafenesin) thins mucus and may help it to loosen more quickly  Saline drops or nasal sprays may  loosen mucus.  Sit in the bathroom with the door closed and hot shower running to loosen mucus.  Contact primary care clinic if you do not have any relief from your symptoms after 10 days.  Present to emergency room for significantly increasing pain, persistent high fever >102F, swelling/redness around your eyes, changes in your vision or ability to move your eyes, altered mental status or a severe headache.    Follow up with primary care provider with any problems, questions or concerns or if symptoms worsen or fail to improve. Patient agreed to plan and verbalized understanding.    Nohemy Mendoza, [facility]

## 2024-06-24 ENCOUNTER — ANCILLARY PROCEDURE (OUTPATIENT)
Dept: MAMMOGRAPHY | Facility: CLINIC | Age: 49
End: 2024-06-24
Attending: ADVANCED PRACTICE MIDWIFE
Payer: COMMERCIAL

## 2024-06-24 DIAGNOSIS — Z12.31 VISIT FOR SCREENING MAMMOGRAM: ICD-10-CM

## 2024-06-24 PROCEDURE — 77067 SCR MAMMO BI INCL CAD: CPT | Mod: 26 | Performed by: RADIOLOGY

## 2024-06-24 PROCEDURE — 77063 BREAST TOMOSYNTHESIS BI: CPT | Mod: 26 | Performed by: RADIOLOGY

## 2024-06-24 PROCEDURE — 77063 BREAST TOMOSYNTHESIS BI: CPT

## 2024-07-07 ENCOUNTER — HEALTH MAINTENANCE LETTER (OUTPATIENT)
Age: 49
End: 2024-07-07

## 2024-10-22 ASSESSMENT — ANXIETY QUESTIONNAIRES: GAD7 TOTAL SCORE: 0

## 2025-06-26 ENCOUNTER — ANCILLARY PROCEDURE (OUTPATIENT)
Dept: MAMMOGRAPHY | Facility: CLINIC | Age: 50
End: 2025-06-26
Payer: COMMERCIAL

## 2025-06-26 DIAGNOSIS — Z12.31 VISIT FOR SCREENING MAMMOGRAM: ICD-10-CM

## 2025-06-26 PROCEDURE — 77063 BREAST TOMOSYNTHESIS BI: CPT

## 2025-06-26 PROCEDURE — 77063 BREAST TOMOSYNTHESIS BI: CPT | Mod: 26 | Performed by: STUDENT IN AN ORGANIZED HEALTH CARE EDUCATION/TRAINING PROGRAM

## 2025-06-26 PROCEDURE — 77067 SCR MAMMO BI INCL CAD: CPT | Mod: 26 | Performed by: STUDENT IN AN ORGANIZED HEALTH CARE EDUCATION/TRAINING PROGRAM

## 2025-07-19 ENCOUNTER — HEALTH MAINTENANCE LETTER (OUTPATIENT)
Age: 50
End: 2025-07-19

## 2025-08-07 ENCOUNTER — NURSE TRIAGE (OUTPATIENT)
Dept: NURSING | Facility: CLINIC | Age: 50
End: 2025-08-07
Payer: COMMERCIAL

## (undated) DEVICE — SNARE CAPIVATOR ROUND COLD SNR BX10 M00561101

## (undated) DEVICE — SOL WATER IRRIG 500ML BOTTLE 2F7113

## (undated) DEVICE — SUCTION MANIFOLD NEPTUNE 2 SYS 1 PORT 702-025-000

## (undated) DEVICE — TUBING SUCTION 12"X1/4" N612

## (undated) DEVICE — SPECIMEN CONTAINER 3OZ W/FORMALIN 59901

## (undated) DEVICE — GOWN IMPERVIOUS 2XL BLUE

## (undated) DEVICE — KIT ENDO TURNOVER/PROCEDURE CARRY-ON 101822